# Patient Record
Sex: FEMALE | Race: BLACK OR AFRICAN AMERICAN | NOT HISPANIC OR LATINO | Employment: OTHER | URBAN - METROPOLITAN AREA
[De-identification: names, ages, dates, MRNs, and addresses within clinical notes are randomized per-mention and may not be internally consistent; named-entity substitution may affect disease eponyms.]

---

## 2018-02-08 ENCOUNTER — CONVERSION ENCOUNTER (OUTPATIENT)
Dept: GENERAL RADIOLOGY | Facility: HOSPITAL | Age: 63
End: 2018-02-08

## 2020-07-16 ENCOUNTER — HOSPITAL ENCOUNTER (OUTPATIENT)
Dept: GENERAL RADIOLOGY | Facility: HOSPITAL | Age: 65
Discharge: HOME | End: 2020-07-16
Attending: NURSE PRACTITIONER

## 2020-10-12 ENCOUNTER — OFFICE VISIT CONVERTED (OUTPATIENT)
Dept: FAMILY MEDICINE CLINIC | Facility: CLINIC | Age: 65
End: 2020-10-12
Attending: FAMILY MEDICINE

## 2020-10-12 ENCOUNTER — HOSPITAL ENCOUNTER (OUTPATIENT)
Dept: FAMILY MEDICINE CLINIC | Facility: CLINIC | Age: 65
Discharge: HOME | End: 2020-10-12
Attending: FAMILY MEDICINE

## 2020-10-12 ENCOUNTER — CONVERSION ENCOUNTER (OUTPATIENT)
Dept: FAMILY MEDICINE CLINIC | Facility: CLINIC | Age: 65
End: 2020-10-12

## 2020-10-12 LAB
ALBUMIN SERPL-MCNC: 4.4 G/DL (ref 3.5–5)
ALBUMIN SERPL-MCNC: 4.5 G/DL (ref 3.5–5)
ALBUMIN/GLOB SERPL: 1.2 {RATIO} (ref 1.4–2.6)
ALP SERPL-CCNC: 128 U/L (ref 43–160)
ALT SERPL-CCNC: 12 U/L (ref 10–40)
ANION GAP SERPL CALC-SCNC: 16 MMOL/L (ref 8–19)
ANION GAP SERPL CALC-SCNC: 17 MMOL/L (ref 8–19)
APPEARANCE UR: CLEAR
AST SERPL-CCNC: 17 U/L (ref 15–50)
BASOPHILS # BLD AUTO: 0.04 10*3/UL (ref 0–0.2)
BASOPHILS NFR BLD AUTO: 0.8 % (ref 0–3)
BILIRUB SERPL-MCNC: 0.21 MG/DL (ref 0.2–1.3)
BILIRUB UR QL: NEGATIVE
BUN SERPL-MCNC: 16 MG/DL (ref 5–25)
BUN SERPL-MCNC: 17 MG/DL (ref 5–25)
BUN/CREAT SERPL: 16 {RATIO} (ref 6–20)
BUN/CREAT SERPL: 17 {RATIO} (ref 6–20)
CALCIUM SERPL-MCNC: 9.7 MG/DL (ref 8.7–10.4)
CALCIUM SERPL-MCNC: 9.9 MG/DL (ref 8.7–10.4)
CHLORIDE SERPL-SCNC: 100 MMOL/L (ref 99–111)
CHLORIDE SERPL-SCNC: 99 MMOL/L (ref 99–111)
CHOLEST SERPL-MCNC: 172 MG/DL (ref 107–200)
CHOLEST/HDLC SERPL: 3 {RATIO} (ref 3–6)
COLOR UR: YELLOW
CONV ABS IMM GRAN: 0.02 10*3/UL (ref 0–0.2)
CONV BACTERIA: NEGATIVE
CONV CO2: 27 MMOL/L (ref 22–32)
CONV CO2: 27 MMOL/L (ref 22–32)
CONV COLLECTION SOURCE (UA): ABNORMAL
CONV CREATININE URINE, RANDOM: 52.8 MG/DL (ref 10–300)
CONV IMMATURE GRAN: 0.4 % (ref 0–1.8)
CONV PROTEIN TO CREATININE RATIO (RANDOM URINE): 0.08 {RATIO} (ref 0–0.1)
CONV TOTAL PROTEIN: 8 G/DL (ref 6.3–8.2)
CONV UROBILINOGEN IN URINE BY AUTOMATED TEST STRIP: 0.2 {EHRLICHU}/DL (ref 0.1–1)
CREAT UR-MCNC: 1 MG/DL (ref 0.5–0.9)
CREAT UR-MCNC: 1.03 MG/DL (ref 0.5–0.9)
DEPRECATED RDW RBC AUTO: 44.3 FL (ref 36.4–46.3)
EOSINOPHIL # BLD AUTO: 0.18 10*3/UL (ref 0–0.7)
EOSINOPHIL # BLD AUTO: 3.4 % (ref 0–7)
ERYTHROCYTE [DISTWIDTH] IN BLOOD BY AUTOMATED COUNT: 13.3 % (ref 11.7–14.4)
EST. AVERAGE GLUCOSE BLD GHB EST-MCNC: 123 MG/DL
GFR SERPLBLD BASED ON 1.73 SQ M-ARVRAT: >60 ML/MIN/{1.73_M2}
GFR SERPLBLD BASED ON 1.73 SQ M-ARVRAT: >60 ML/MIN/{1.73_M2}
GLOBULIN UR ELPH-MCNC: 3.6 G/DL (ref 2–3.5)
GLUCOSE SERPL-MCNC: 93 MG/DL (ref 65–99)
GLUCOSE SERPL-MCNC: 97 MG/DL (ref 65–99)
GLUCOSE UR QL: NEGATIVE MG/DL
HBA1C MFR BLD: 5.9 % (ref 3.5–5.7)
HCT VFR BLD AUTO: 43.5 % (ref 37–47)
HDLC SERPL-MCNC: 57 MG/DL (ref 40–60)
HGB BLD-MCNC: 13.2 G/DL (ref 12–16)
HGB UR QL STRIP: ABNORMAL
KETONES UR QL STRIP: NEGATIVE MG/DL
LDLC SERPL CALC-MCNC: 97 MG/DL (ref 70–100)
LEUKOCYTE ESTERASE UR QL STRIP: NEGATIVE
LYMPHOCYTES # BLD AUTO: 1.86 10*3/UL (ref 1–5)
LYMPHOCYTES NFR BLD AUTO: 35.1 % (ref 20–45)
MCH RBC QN AUTO: 27.4 PG (ref 27–31)
MCHC RBC AUTO-ENTMCNC: 30.3 G/DL (ref 33–37)
MCV RBC AUTO: 90.2 FL (ref 81–99)
MONOCYTES # BLD AUTO: 0.54 10*3/UL (ref 0.2–1.2)
MONOCYTES NFR BLD AUTO: 10.2 % (ref 3–10)
NEUTROPHILS # BLD AUTO: 2.66 10*3/UL (ref 2–8)
NEUTROPHILS NFR BLD AUTO: 50.1 % (ref 30–85)
NITRITE UR QL STRIP: NEGATIVE
NRBC CBCN: 0 % (ref 0–0.7)
OSMOLALITY SERPL CALC.SUM OF ELEC: 289 MOSM/KG (ref 273–304)
PH UR STRIP.AUTO: 5 [PH] (ref 5–8)
PHOSPHATE SERPL-MCNC: 3.3 MG/DL (ref 2.4–4.5)
PLATELET # BLD AUTO: 270 10*3/UL (ref 130–400)
PMV BLD AUTO: 11.4 FL (ref 9.4–12.3)
POTASSIUM SERPL-SCNC: 3.8 MMOL/L (ref 3.5–5.3)
POTASSIUM SERPL-SCNC: 3.9 MMOL/L (ref 3.5–5.3)
PROT UR QL: NEGATIVE MG/DL
PROT UR-MCNC: <4 MG/DL
RBC # BLD AUTO: 4.82 10*6/UL (ref 4.2–5.4)
RBC #/AREA URNS HPF: ABNORMAL /[HPF]
SODIUM SERPL-SCNC: 139 MMOL/L (ref 135–147)
SODIUM SERPL-SCNC: 139 MMOL/L (ref 135–147)
SP GR UR: 1.01 (ref 1–1.03)
TRIGL SERPL-MCNC: 92 MG/DL (ref 40–150)
VLDLC SERPL-MCNC: 18 MG/DL (ref 5–37)
WBC # BLD AUTO: 5.3 10*3/UL (ref 4.8–10.8)
WBC #/AREA URNS HPF: ABNORMAL /[HPF]

## 2020-10-13 LAB
C3 SERPL-MCNC: 173 MG/DL (ref 82–167)
C4 SERPL-MCNC: 45 MG/DL (ref 14–44)

## 2020-10-14 LAB
DSDNA AB SER-ACNC: NEGATIVE [IU]/ML
ENA AB SER IA-ACNC: POSITIVE {RATIO}

## 2020-10-15 LAB
CENTROMERE AB TITR SER IF: 38 [IU]/ML (ref 0–7)
ENA SCL70 AB SER QL IA: <0.6 [IU]/ML (ref 0–7)
JO-1 (WB)*: <0.3 [IU]/ML (ref 0–7)
RNP: 0.6 [IU]/ML (ref 0–5)
RNP: <0.3 [IU]/ML (ref 0–7)
SMI: 0.9 [IU]/ML (ref 0–7)
SSA (RO) (ENA) AB, IGG: <0.3 [IU]/ML (ref 0–7)
SSB (LA) ENA AB, IGG: <0.3 [IU]/ML (ref 0–7)

## 2020-10-21 ENCOUNTER — OFFICE VISIT CONVERTED (OUTPATIENT)
Dept: ORTHOPEDIC SURGERY | Facility: CLINIC | Age: 65
End: 2020-10-21
Attending: ORTHOPAEDIC SURGERY

## 2020-11-17 ENCOUNTER — HOSPITAL ENCOUNTER (OUTPATIENT)
Dept: PREADMISSION TESTING | Facility: HOSPITAL | Age: 65
Discharge: HOME | End: 2020-11-17
Attending: ORTHOPAEDIC SURGERY

## 2020-11-17 LAB
ALBUMIN SERPL-MCNC: 4.1 G/DL (ref 3.5–5)
ALBUMIN/GLOB SERPL: 1.3 {RATIO} (ref 1.4–2.6)
ALP SERPL-CCNC: 116 U/L (ref 43–160)
ALT SERPL-CCNC: 10 U/L (ref 10–40)
ANION GAP SERPL CALC-SCNC: 15 MMOL/L (ref 8–19)
APTT BLD: 22.5 S (ref 22.2–34.2)
AST SERPL-CCNC: 15 U/L (ref 15–50)
BASOPHILS # BLD AUTO: 0.04 10*3/UL (ref 0–0.2)
BASOPHILS NFR BLD AUTO: 0.8 % (ref 0–3)
BILIRUB SERPL-MCNC: 0.36 MG/DL (ref 0.2–1.3)
BUN SERPL-MCNC: 14 MG/DL (ref 5–25)
BUN/CREAT SERPL: 13 {RATIO} (ref 6–20)
CALCIUM SERPL-MCNC: 10.3 MG/DL (ref 8.7–10.4)
CHLORIDE SERPL-SCNC: 102 MMOL/L (ref 99–111)
CONV ABS IMM GRAN: 0.01 10*3/UL (ref 0–0.2)
CONV CO2: 28 MMOL/L (ref 22–32)
CONV IMMATURE GRAN: 0.2 % (ref 0–1.8)
CONV TOTAL PROTEIN: 7.3 G/DL (ref 6.3–8.2)
CREAT UR-MCNC: 1.04 MG/DL (ref 0.5–0.9)
DEPRECATED RDW RBC AUTO: 40.1 FL (ref 36.4–46.3)
EOSINOPHIL # BLD AUTO: 0.13 10*3/UL (ref 0–0.7)
EOSINOPHIL # BLD AUTO: 2.5 % (ref 0–7)
ERYTHROCYTE [DISTWIDTH] IN BLOOD BY AUTOMATED COUNT: 13 % (ref 11.7–14.4)
EST. AVERAGE GLUCOSE BLD GHB EST-MCNC: 140 MG/DL
GFR SERPLBLD BASED ON 1.73 SQ M-ARVRAT: >60 ML/MIN/{1.73_M2}
GLOBULIN UR ELPH-MCNC: 3.2 G/DL (ref 2–3.5)
GLUCOSE SERPL-MCNC: 100 MG/DL (ref 65–99)
HBA1C MFR BLD: 6.5 % (ref 3.5–5.7)
HCT VFR BLD AUTO: 39.7 % (ref 37–47)
HGB BLD-MCNC: 13.1 G/DL (ref 12–16)
INR PPP: 1.03 (ref 2–3)
LYMPHOCYTES # BLD AUTO: 1.59 10*3/UL (ref 1–5)
LYMPHOCYTES NFR BLD AUTO: 31.2 % (ref 20–45)
MCH RBC QN AUTO: 28.2 PG (ref 27–31)
MCHC RBC AUTO-ENTMCNC: 33 G/DL (ref 33–37)
MCV RBC AUTO: 85.4 FL (ref 81–99)
MONOCYTES # BLD AUTO: 0.49 10*3/UL (ref 0.2–1.2)
MONOCYTES NFR BLD AUTO: 9.6 % (ref 3–10)
NEUTROPHILS # BLD AUTO: 2.84 10*3/UL (ref 2–8)
NEUTROPHILS NFR BLD AUTO: 55.7 % (ref 30–85)
NRBC CBCN: 0 % (ref 0–0.7)
OSMOLALITY SERPL CALC.SUM OF ELEC: 293 MOSM/KG (ref 273–304)
PLATELET # BLD AUTO: 293 10*3/UL (ref 130–400)
PMV BLD AUTO: 10.2 FL (ref 9.4–12.3)
POTASSIUM SERPL-SCNC: 3.9 MMOL/L (ref 3.5–5.3)
PROTHROMBIN TIME: 11 S (ref 9.4–12)
RBC # BLD AUTO: 4.65 10*6/UL (ref 4.2–5.4)
SODIUM SERPL-SCNC: 141 MMOL/L (ref 135–147)
WBC # BLD AUTO: 5.1 10*3/UL (ref 4.8–10.8)

## 2020-11-19 ENCOUNTER — OFFICE VISIT CONVERTED (OUTPATIENT)
Dept: FAMILY MEDICINE CLINIC | Facility: CLINIC | Age: 65
End: 2020-11-19
Attending: FAMILY MEDICINE

## 2020-11-25 ENCOUNTER — HOSPITAL ENCOUNTER (OUTPATIENT)
Dept: PREADMISSION TESTING | Facility: HOSPITAL | Age: 65
Discharge: HOME | End: 2020-11-25
Attending: ORTHOPAEDIC SURGERY

## 2020-11-28 LAB — SARS-COV-2 RNA SPEC QL NAA+PROBE: NOT DETECTED

## 2020-12-01 ENCOUNTER — HOSPITAL ENCOUNTER (OUTPATIENT)
Dept: PERIOP | Facility: HOSPITAL | Age: 65
Setting detail: HOSPITAL OUTPATIENT SURGERY
Discharge: HOME | End: 2020-12-02
Attending: INTERNAL MEDICINE

## 2020-12-02 LAB
ANION GAP SERPL CALC-SCNC: 17 MMOL/L (ref 8–19)
BNP SERPL-MCNC: 346 PG/ML (ref 0–900)
BUN SERPL-MCNC: 19 MG/DL (ref 5–25)
BUN/CREAT SERPL: 15 {RATIO} (ref 6–20)
CALCIUM SERPL-MCNC: 9 MG/DL (ref 8.7–10.4)
CHLORIDE SERPL-SCNC: 101 MMOL/L (ref 99–111)
CONV CO2: 25 MMOL/L (ref 22–32)
CREAT UR-MCNC: 1.26 MG/DL (ref 0.5–0.9)
GFR SERPLBLD BASED ON 1.73 SQ M-ARVRAT: 52 ML/MIN/{1.73_M2}
GLUCOSE SERPL-MCNC: 155 MG/DL (ref 65–99)
HCT VFR BLD AUTO: 36.6 % (ref 37–47)
HGB BLD-MCNC: 11.7 G/DL (ref 12–16)
M PNEUMO IGM SER QL: NEGATIVE
OSMOLALITY SERPL CALC.SUM OF ELEC: 291 MOSM/KG (ref 273–304)
POTASSIUM SERPL-SCNC: 4.7 MMOL/L (ref 3.5–5.3)
SARS-COV-2 RNA SPEC QL NAA+PROBE: NOT DETECTED
SODIUM SERPL-SCNC: 138 MMOL/L (ref 135–147)

## 2020-12-04 LAB — BACTERIA SPEC AEROBE CULT: NORMAL

## 2020-12-14 ENCOUNTER — OFFICE VISIT CONVERTED (OUTPATIENT)
Dept: ORTHOPEDIC SURGERY | Facility: CLINIC | Age: 65
End: 2020-12-14
Attending: PHYSICIAN ASSISTANT

## 2021-01-15 ENCOUNTER — OFFICE VISIT CONVERTED (OUTPATIENT)
Dept: ORTHOPEDIC SURGERY | Facility: CLINIC | Age: 66
End: 2021-01-15
Attending: PHYSICIAN ASSISTANT

## 2021-02-22 ENCOUNTER — OFFICE VISIT CONVERTED (OUTPATIENT)
Dept: FAMILY MEDICINE CLINIC | Facility: CLINIC | Age: 66
End: 2021-02-22
Attending: FAMILY MEDICINE

## 2021-02-22 ENCOUNTER — HOSPITAL ENCOUNTER (OUTPATIENT)
Dept: FAMILY MEDICINE CLINIC | Facility: CLINIC | Age: 66
Discharge: HOME | End: 2021-02-22
Attending: FAMILY MEDICINE

## 2021-02-22 LAB
CHOLEST SERPL-MCNC: 183 MG/DL (ref 107–200)
CHOLEST/HDLC SERPL: 3.2 {RATIO} (ref 3–6)
EST. AVERAGE GLUCOSE BLD GHB EST-MCNC: 114 MG/DL
HBA1C MFR BLD: 5.6 % (ref 3.5–5.7)
HDLC SERPL-MCNC: 58 MG/DL (ref 40–60)
LDLC SERPL CALC-MCNC: 109 MG/DL (ref 70–100)
TRIGL SERPL-MCNC: 82 MG/DL (ref 40–150)
VLDLC SERPL-MCNC: 16 MG/DL (ref 5–37)

## 2021-02-24 ENCOUNTER — OFFICE VISIT CONVERTED (OUTPATIENT)
Dept: ORTHOPEDIC SURGERY | Facility: CLINIC | Age: 66
End: 2021-02-24
Attending: PHYSICIAN ASSISTANT

## 2021-02-24 ENCOUNTER — HOSPITAL ENCOUNTER (OUTPATIENT)
Dept: GENERAL RADIOLOGY | Facility: HOSPITAL | Age: 66
Discharge: HOME | End: 2021-02-24
Attending: FAMILY MEDICINE

## 2021-03-19 ENCOUNTER — HOSPITAL ENCOUNTER (OUTPATIENT)
Dept: VACCINE CLINIC | Facility: HOSPITAL | Age: 66
Discharge: HOME | End: 2021-03-19
Attending: INTERNAL MEDICINE

## 2021-04-09 ENCOUNTER — OFFICE VISIT CONVERTED (OUTPATIENT)
Dept: ORTHOPEDIC SURGERY | Facility: CLINIC | Age: 66
End: 2021-04-09

## 2021-04-16 ENCOUNTER — HOSPITAL ENCOUNTER (OUTPATIENT)
Dept: VACCINE CLINIC | Facility: HOSPITAL | Age: 66
Discharge: HOME | End: 2021-04-16
Attending: INTERNAL MEDICINE

## 2021-05-10 NOTE — H&P
History and Physical      Patient Name: Wilner Jacobson   Patient ID: [id number]   Sex: Female   YOB: 1955    Referring Provider: Abrahan Wilson MD    Visit Date: October 12, 2020    Provider: Abrahan Rick DO   [place] [facility] [place] Address: [address] [place] [address], [address] [place] [address]  [id number]   Location Phone: [phone]          Chief Complaint  · establish care      History Of Present Illness  Wilner Jacobson is a 65 year old /Black female who presents for evaluation and treatment of:      Patient presents today to establish care with myself.  She is previously seen at [facility] in [place].  She reports her insurance changed.  She reports having had labs done a few months ago but I do not have record of this.  Reviewing her hospital record shows that back in June 2018 she had no anemia with normal WBC count and platelets.  Creatinine is slightly elevated at 1.13.  She does report that she sees Dr. Mukherjee from nephrology.  She does have blood in her urine.  She also has issues with arthritis.  She reports that she has bilateral knee pain.  She previously was seeing Dr. Torres.  Corticosteroid injections have only lasted for about a week.  She is requesting to get back in to be seen.  She was told previously that she would need her left knee replaced.  She has issues with both knees.  She does take diclofenac and ibuprofen although she was discouraged on using ibuprofen given her renal function.  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  She reports completing her treatment therapy but she is unsure what she took.  She does have a card from the health department but it does not state what medication she was taking.  She did have a mammogram recently back in July which was BI-RADS 1.  Patient is due for labs so we discussed getting these done today and seeing her back in about 1 month  for follow-up.  She also has lab orders from her nephrologist.  She is not seeing any other doctors at this time.  She does have a son who is 36 and a daughter who is 37.  Her son has had lymphoma.  Her children live close by.  She is originally from the [place] area but she moved here about 10 years ago.       Past Medical History  Arthritis; Bladder Disorder; Hematuria; High cholesterol; Hyperlipemia; Hypertension; Renal Cyst         Past Surgical History  Cystoscopy; I have had no surgeries         Medication List  cyclobenzaprine 5 mg oral tablet; diclofenac sodium 1 % topical gel; hydrochlorothiazide 25 mg oral tablet; ibuprofen 800 mg oral tablet; lisinopril 40 mg oral tablet; meclizine 25 mg oral tablet; simvastatin 20 mg oral tablet         Allergy List  NO KNOWN DRUG ALLERGIES       Allergies Reconciled  Family Medical History  *Non Contributory; Family history of certain chronic disabling diseases; arthritis         Social History  Alcohol Use (Current some day); lives with spouse; .; Recreational Drug Use (Never); Tobacco (Never); Working         Review of Systems     General: Denies any fever, chills, weight changes, or night sweats  HEENT:  Denies any vision or hearing changes. Denies any neck tenderness. Denies any headaches. Denies nasal congestion  Cardiovascular: Denies any chest pain or palpitations  respiratory: Denies any cough or wheezing. Denies any shortness of breath  Gastrointestinal: Denies any nausea vomiting or diarrhea, Denies constipation  Extremities: Denies any edema  Psychiatric: Denies any changes in mood or affect  Neurologic: Denies any neurologic deficits  skin: Denies any rashes or lesions.  endocrine: Denies any weight loss, fever, night sweats  Musculoskeletal: Bilateral knee pain       Vitals  Date Time BP Position Site L\R Cuff Size HR RR TEMP (F) WT  HT  BMI kg/m2 BSA m2 O2 Sat FR L/min FiO2 HC       10/12/2020 09:22 /70 Sitting    80 - R  98.3 246lbs 4oz 5'  " 4\" 42.27 2.25 98 %            Physical Examination     General: AAO 3, no acute distress, pleasant  HEENT: Normocephalic, atraumatic, no discharge in the eyes, no discharge from the nose, no oropharyngeal erythema or exudates, and TMs intact bilaterally with no erythema, no cervical tenderness or lymphadenopathy  Cardiovascular: Regular rate and rhythm without appreciable murmur  Respiratory: Clear to auscultation bilaterally no RRW  Gastrointestinal: Soft nontender nondistended with bowel sounds present  extremities: No clubbing, cyanosis or edema  Neurologic: CN II through XII grossly intact   Psychiatric: Normal mood and affect           Assessment  · Screening for depression     V79.0/Z13.89  · Need for influenza vaccination     V04.81/Z23  · Need for pneumococcal vaccination     V03.82/Z23  · Hypertension     401.9/I10  · Medication monitoring encounter     V58.83/Z51.81  · Abnormal glucose     790.29/R73.09  · HLD (hyperlipidemia)     272.4/E78.5  · Left knee pain     719.46/M25.562  · Bilateral knee pain       Pain in right knee     719.46/M25.561  Pain in left knee     719.46/M25.562  · Constipation     564.00/K59.00      Plan  · Orders  o ACO-14: Influenza immunization was not administered for reasons documented () - V04.81/Z23 - 10/12/2020   DECLINES  o CMP [facility] (09849) - 401.9/I10 - 10/12/2020  o Hgb A1c [facility] ([id number]) - 790.29/R73.09 - 10/12/2020  o Lipid Panel [facility] (89675) - 272.4/E78.5 - 10/12/2020  o ACO-39: Current medications updated and reviewed (1159F, ) - - 10/12/2020  o ACO-18: Negative screen for clinical depression using a standardized tool () - V79.0/Z13.89 - 10/12/2020  o ACO-14: Influenza immunization was not administered for reasons documented [facility] () - - 10/12/2020   declines  o ORTHOPEDIC CONSULTATION (ORTHO) - 719.46/M25.561, 719.46/M25.562 - 10/12/2020   Please refer back to Dr. Torres locally  · Medications  o hydrochlorothiazide 25 mg oral tablet   SIG: take 1 tablet (25 " mg) by oral route once daily for blood pressure   DISP: (90) Tablet with 3 refills  Refilled on 10/12/2020     o lisinopril 40 mg oral tablet   SIG: take 1 tablet (40 mg) by oral route once daily for blood pressure   DISP: (90) Tablet with 3 refills  Refilled on 10/12/2020     o simvastatin 20 mg oral tablet   SIG: take 1 tablet (20 mg) by oral route once daily in the evening   DISP: (90) Tablet with 3 refills  Refilled on 10/12/2020     · Instructions  o Depression Screen completed and scanned into the EMR under the designated folder within the patient's documents.  o Today's PHQ-9 result is __0_  o Patient was educated/instructed on their diagnosis, treatment and medications prior to discharge from the clinic today.  o Patient instructed to seek medical attention urgently for new or worsening symptoms.  o Call the office with any concerns or questions.  o Plan as documented above. Discussed continue current medical management. She does have hypertension and is taking 25 mg of hydrochlorothiazide daily. She is also taking lisinopril and simvastatin. I would like to see her back in 1 month to discuss her labs and any other issues that may come up. I encouraged her to call if there is any issues or come up in the meantime. Patient verbalized understanding and is in agreement with treatment and management plan.  o I will refer her to orthopedics for her bilateral knee pain.  · Disposition  o Follow Up in 1 month.            Electronically Signed by: Abrahan Rick DO -Author on October 12, 2020 12:55:36 PM

## 2021-05-10 NOTE — H&P
History and Physical      Patient Name: Wilner Jacobson   Patient ID: [id number]   Sex: Female   YOB: 1955    Referring Provider: Abrahan Wilson MD    Visit Date: October 21, 2020    Provider: Dada Torres MD   Location: [facility]   Location Address: [address] [place] [address]  [id number]   Location Phone: [phone]          Chief Complaint  · Bilateral Knee Pain      History Of Present Illness  Wilner Jacobson is a 65 year old /Black female who presents today to [facility].      Patient presents today with a chief complaint of bilateral knee pain. Patient states her left knee is worse than her right. Patient states she got a new PCP and wanted her to follow-up with Dr. Torres. 4 years ago patient had some interest in a left total knee but tried holding it off on it until now. Patient ambulates with a cane due to knee pain.       Past Medical History  Arthritis; Bladder Disorder; Hematuria; High cholesterol; Hyperlipemia; Hypertension; Renal Cyst         Past Surgical History  Cystoscopy; I have had no surgeries         Medication List  cyclobenzaprine 5 mg oral tablet; diclofenac sodium 1 % topical gel; hydrochlorothiazide 25 mg oral tablet; ibuprofen 800 mg oral tablet; lisinopril 40 mg oral tablet; meclizine 25 mg oral tablet; simvastatin 20 mg oral tablet         Allergy List  NO KNOWN DRUG ALLERGIES         Family Medical History  Diabetes, unspecified type; *Non Contributory; Family history of certain chronic disabling diseases; arthritis; Family history of Arthritis         Social History  Alcohol Use (Current some day); lives with spouse; .; Recreational Drug Use (Never); Retired.; Tobacco (Never); Working         Immunizations  Name Date Admin   Influenza Refused         Review of Systems  · Constitutional  o Denies  o : fever, chills, weight loss  · Cardiovascular  o Denies  o : chest pain, shortness of  "breath  · Gastrointestinal  o Denies  o : liver disease, heartburn, nausea, blood in stools  · Genitourinary  o Denies  o : painful urination, blood in urine  · Integument  o Denies  o : rash, itching  · Neurologic  o Denies  o : headache, weakness, loss of consciousness  · Musculoskeletal  o Denies  o : painful, swollen joints  · Psychiatric  o Denies  o : drug/alcohol addiction, anxiety, depression      Vitals  Date Time BP Position Site L\R Cuff Size HR RR TEMP (F) WT  HT  BMI kg/m2 BSA m2 O2 Sat FR L/min FiO2 HC       10/21/2020 02:42 PM      80 - R   248lbs 8oz 5'  4\" 42.65 2.26 96 %            Physical Examination  · Constitutional  o Appearance  o : well developed, well-nourished, no obvious deformities present  · Head and Face  o Head  o :   § Inspection  § : normocephalic  o Face  o :   § Inspection  § : no facial lesions  · Eyes  o Conjunctivae  o : conjunctivae normal  o Sclerae  o : sclerae white  · Ears, Nose, Mouth and Throat  o Ears  o :   § External Ears  § : appearance within normal limits  § Hearing  § : intact  o Nose  o :   § External Nose  § : appearance normal  · Neck  o Inspection/Palpation  o : normal appearance  o Range of Motion  o : full range of motion  · Respiratory  o Respiratory Effort  o : breathing unlabored  o Inspection of Chest  o : normal appearance  o Auscultation of Lungs  o : no audible wheezing or rales  · Cardiovascular  o Heart  o : regular rate  · Gastrointestinal  o Abdominal Examination  o : soft and non-tender  · Skin and Subcutaneous Tissue  o General Inspection  o : intact, no rashes  · Psychiatric  o General  o : Alert and oriented x3  o Judgement and Insight  o : judgment and insight intact  o Mood and Affect  o : mood normal, affect appropriate  · Right Knee  o Inspection  o : Sensation grossly intact. Neurovascular intact. Pulses normal. Skin intact. Ambulation with a cane. Full weight-bearing. Crepitus present. Good strength in quadriceps, hamstrings, " dorsiflexors, and plantar flexors. Mildly tender medial joint line. Mildly tender lateral joint line. Negative Lachman. Negative Apley's. Negative Ceci's. Negative O'Briens.   · Left Knee  o Inspection  o : Sensation grossly intact. Neurovascular intact. Pulses normal. Skin intact. Ambulation with a cane. Full weight-bearing. Crepitus present. Good strength in quadriceps, hamstrings, dorsiflexors, and plantar flexors. Tender medial joint line. Tender lateral joint line. Negative Lachman. Negative Apley's. Negative Ceci's. Negative O'Briens.   · In Office Procedures  o View  o : LAT/SUNRISE/STANDING   o Site  o : bilateral, knee  o Indication  o : Bilateral knee pain  o Study  o : X-rays ordered, taken in the office, and reviewed today.  o Xray  o : Advanced degenerative changes consistent with osteoarthritis. Degenerative changes present with the right knee. Negative signs of fracture or dislocation in bilateral knees. Kellgren and Denis grade 4 showing large osteophytes and marked narrowing of joint space.           Assessment  · Primary osteoarthritis of right knee     715.16/M17.11  · Primary osteoarthritis of left knee     715.16/M17.12  · Pain in both knees, unspecified chronicity       Pain in right knee     719.46/M25.561  Pain in left knee     719.46/M25.562      Plan  · Orders  o Knee (Left) [facility] ([id number]) - 719.46/M25.562 - 10/21/2020  o Knee (Right) [facility] ([id number]) - 719.46/M25.561 - 10/21/2020  · Medications  o Medications have been Reconciled  o Transition of Care or Provider Policy  · Instructions  o Dr. Torres saw and examined the patient and agrees with plan.   o X-rays reviewed by Dr. Torres.  o Reviewed the patient's Past Medical, Social, and Family history as well as the ROS at today's visit, no changes.  o Call or return if worsening symptoms.  o Discussed surgery.  o Risks/benefits discussed with patient including, but not limited to: infection, bleeding,  neurovascular damage, malunion, nonunion, aesthetic deformity, need for further surgery, and death.  o Discussed with patient the implant type being used during surgery and patient understands and desires to proceed.  o Surgery pamphlet given.  o Follow Up post-op.  o This note was transcribed by Kimmy Cote. [email or web address]  o Discussed diagnosis and treatment options with the patient. Patient opted for a left total knee replacement. The patient was given pre-operative muscle strengthening and flexibility exercises to work on at home. It was also discussed with the patient a weight reduction plan to help with joint pain pre and post-op procedure. Patient will work on loosing 25 > lbs prior to scheduled procedure.            Electronically Signed by: Kimmy Cote-, Other -Author on November 16, 2020 09:39:31 AM  Electronically Co-signed by: Dada Torres MD -Reviewer on November 16, 2020 07:47:50 PM

## 2021-05-13 NOTE — PROGRESS NOTES
Progress Note      Patient Name: Wilner Jacobson   Patient ID: [id number]   Sex: Female   YOB: 1955    Referring Provider: Abrahan Wilson MD    Visit Date: November 19, 2020    Provider: Abrahan Rick DO   [place] [facility] [place]   Location Address: [address] [place] [address], [address] [place] [address]  [id number]   Location Phone: [phone]          Chief Complaint  · follow up      History Of Present Illness  Wilner Jacobson is a 65 year old /Black female who presents for evaluation and treatment of:      Patient presents for 1 month follow-up.  Since last time I saw her she has been in to see orthopedics, Dr. Palafox.  They are considering a left knee replacement.  She had her preoperative work done.  Unfortunately A1c has now increased to 6.5%.  This would give her a new diagnosis of diabetes.  She has been prediabetic for quite some time.  We discussed lifestyle modification including diet and exercise.  We will repeat her A1c in 3 months and reassess from there.       Past Medical History  Arthritis; Bladder Disorder; Hematuria; High cholesterol; Hyperlipemia; Hypertension; Renal Cyst         Past Surgical History  Cystoscopy; I have had no surgeries         Medication List  cyclobenzaprine 5 mg oral tablet; diclofenac sodium 1 % topical gel; hydrochlorothiazide 25 mg oral tablet; ibuprofen 800 mg oral tablet; lisinopril 40 mg oral tablet; meclizine 25 mg oral tablet; simvastatin 20 mg oral tablet         Allergy List  NO KNOWN DRUG ALLERGIES         Family Medical History  Diabetes, unspecified type; *Non Contributory; Family history of certain chronic disabling diseases; arthritis; Family history of Arthritis         Social History  Alcohol Use (Current some day); lives with spouse; .; Recreational Drug Use (Never); Retired.; Tobacco (Never); Working         Immunizations  Name Date Admin   Influenza Refused         Review of Systems     Gen: Denies any fever,  "chills, or weight changes  HEENT: Denies any changes in vision or hearing, denies nasal congestion, denies any neck tenderness or lymphadenopathy  Extremities: Occasional edema around ankles bilaterally  Psychiatric: Denies any changes in mood or affect  Neurologic: Denies any deficits  Skin: Denies any rashes  Musculoskeletal: Reports muscle cramps/spasms at times in her legs.       Vitals  Date Time BP Position Site L\R Cuff Size HR RR TEMP (F) WT  HT  BMI kg/m2 BSA m2 O2 Sat FR L/min FiO2        11/19/2020 11:23 /66 Sitting    97 - R  97.3 255lbs 0oz 5'  4\" 43.77 2.29 99 %            Physical Examination     General: AAO 3, no acute distress, pleasant  HEENT: Normocephalic, atraumatic  Cardiovascular: Regular rate and rhythm without appreciable murmur  Respiratory: Clear to auscultation bilaterally no RRW  Gastrointestinal: Soft nontender nondistended with bowel sounds present  extremities: Dependent edema noted in the lower extremities bilaterally.    Neurologic: CN II through XII grossly intact   Psychiatric: Normal mood and affect           Assessment  · Diabetes mellitus, type 2     250.00/E11.9  · Need for influenza vaccination     V04.81/Z23  · Medication monitoring encounter     V58.83/Z51.81  · Hypertension     401.9/I10  · HLD (hyperlipidemia)     272.4/E78.5  · Bilateral knee pain       Pain in right knee     719.46/M25.561  Pain in left knee     719.46/M25.562  · Muscle spasm     728.85/M62.838  Plan as documented above. Discussed lifestyle modification including diet and exercise. She is planning for a knee replacement in early December. I will see her back in 3 months with labs to be done prior to next appointment. Discussed for her muscle cramps/spasms using a vitamin B complex and/or vitamin E.      Plan  · Orders  o ACO-14: Influenza immunization was not administered for reasons documented () - V04.81/Z23 - 11/19/2020   declines  o Hgb A1c [facility] ([id number]) - 250.00/E11.9, V58.83/Z51.81 - " 02/19/2021  o Lipid Panel [facility] ([id number]) - 272.4/E78.5 - 02/19/2021  o ACO-39: Current medications updated and reviewed (1159F, ) - - 11/19/2020  o ACO-14: Influenza immunization was not administered for reasons documented [facility] () - - 11/19/2020   Declines  o ACO-13: Fall Risk Screening with no falls in past year or only one fall without injury in the past year (1101F) - - 11/19/2020  o ACO - Pt declines to or was not able to provide an Advance Care Plan or name a Surrogate Decision Maker (1124F) - - 11/19/2020  · Instructions  o Patient was educated/instructed on their diagnosis, treatment and medications prior to discharge from the clinic today.  o Patient instructed to seek medical attention urgently for new or worsening symptoms.  o Call the office with any concerns or questions.  · Disposition  o Follow Up in 3 months.            Electronically Signed by: Abrahan Rick DO -Author on November 19, 2020 12:08:01 PM

## 2021-05-13 NOTE — PROGRESS NOTES
Progress Note      Patient Name: Wilner Jacobson   Patient ID: [id number]   Sex: Female   YOB: 1955    Referring Provider: Abrahan Wilson MD    Visit Date: December 14, 2020    Provider: Iva Alan PA-C   [facility]: [facility]   Location Address: [address] [place] [address]  [id number]   Location Phone: [phone]          Chief Complaint  · left knee pain      History Of Present Illness  Wilner Jacobson is a 65 year old /Black female who presents today to [facility].      She is s/p left TKA 12/1/20 by Dr. Torres. She is doing well. She is making progress in PT. She denies fever/chills/numbness/tingling.       Past Medical History  Arthritis; Bladder Disorder; Diabetes; Hematuria; High cholesterol; Hyperlipemia; Hypertension; Renal Cyst         Past Surgical History  Cystoscopy; I have had no surgeries         Medication List  cyclobenzaprine 5 mg oral tablet; diclofenac sodium 1 % topical gel; hydrochlorothiazide 25 mg oral tablet; ibuprofen 800 mg oral tablet; lisinopril 40 mg oral tablet; meclizine 25 mg oral tablet; Norco 7.5-325 mg oral tablet; simvastatin 20 mg oral tablet         Allergy List  NO KNOWN DRUG ALLERGIES       Allergies Reconciled  Family Medical History  Diabetes, unspecified type; *Non Contributory; Family history of certain chronic disabling diseases; arthritis; Family history of Arthritis         Social History  Alcohol Use (Current some day); lives with spouse; .; Recreational Drug Use (Never); Retired.; Tobacco (Never); Working         Review of Systems  · Constitutional  o Denies  o : fever, chills, weight loss  · Cardiovascular  o Denies  o : chest pain, shortness of breath  · Gastrointestinal  o Denies  o : liver disease, heartburn, nausea, blood in stools  · Genitourinary  o Denies  o : painful urination, blood in urine  · Integument  o Denies  o : rash, itching  · Neurologic  o Denies  o : headache, weakness, loss of  "consciousness  · Musculoskeletal  o Admits  o : painful, swollen joints  · Psychiatric  o Denies  o : drug/alcohol addiction, anxiety, depression      Vitals  Date Time BP Position Site L\R Cuff Size HR RR TEMP (F) WT  HT  BMI kg/m2 BSA m2 O2 Sat FR L/min FiO2 HC       12/14/2020 02:19 PM         255lbs 0oz 5'  4\" 43.77 2.29 94 %            Physical Examination  · Constitutional  o Appearance  o : well developed, well-nourished, no obvious deformities present  · Head and Face  o Head  o :   § Inspection  § : normocephalic  o Face  o :   § Inspection  § : no facial lesions  · Eyes  o Conjunctivae  o : conjunctivae normal  o Sclerae  o : sclerae white  · Ears, Nose, Mouth and Throat  o Ears  o :   § External Ears  § : appearance within normal limits  § Hearing  § : intact  o Nose  o :   § External Nose  § : appearance normal  · Neck  o Inspection/Palpation  o : normal appearance  o Range of Motion  o : full range of motion  · Respiratory  o Respiratory Effort  o : breathing unlabored  o Inspection of Chest  o : normal appearance  o Auscultation of Lungs  o : no audible wheezing or rales  · Cardiovascular  o Heart  o : regular rate  · Gastrointestinal  o Abdominal Examination  o : soft and non-tender  · Skin and Subcutaneous Tissue  o General Inspection  o : intact, no rashes  · Psychiatric  o General  o : Alert and oriented x3  o Judgement and Insight  o : judgment and insight intact  o Mood and Affect  o : mood normal, affect appropriate  · Left Knee  o Inspection  o : Well approximated incision. No signs of infection. Extension -5 degrees. Flexion 90 degrees. Patella well tracking. Calf supple/nontender. Negative Adenike's. Neurovascularly intact. Ambulates with walker.   · In Office Procedures  o View  o : AP/LATERAL/SUNRISE   o Site  o : left, knee  o Indication  o : left knee pain  o Study  o : X-rays ordered, taken in the office, and reviewed today.  o Xray  o : Well aligned left TKA  o Comparative Data  o : " Comparative Data found and reviewed today           Assessment  · Aftercare following left knee joint replacement surgery       Aftercare following joint replacement surgery     V54.81/Z47.1  Presence of left artificial knee joint     V54.81/Z96.652  · Left knee pain, unspecified chronicity     719.46/M25.562      Plan  · Orders  o Knee (Left) [facility] Preferred View ([id number]) - 719.46/M25.562 - 12/14/2020  · Medications  o Medications have been Reconciled  o Transition of Care or Provider Policy  · Instructions  o Reviewed the patient's Past Medical, Social, and Family history as well as the ROS at today's visit, no changes.  o Call or return if worsening symptoms.  o Continue PT. Follow Up in 4 weeks.   o Continue PT. Follow up 4 weeks.   o Electronically Identified Patient Education Materials Provided Electronically            Electronically Signed by: Iva Alan PA-C -Author on December 14, 2020 04:34:17 PM  Electronically Co-signed by: Mikala Meyer, -Reviewer on April 30, 2021 01:03:22 PM

## 2021-05-14 VITALS
TEMPERATURE: 97.3 F | WEIGHT: 255 LBS | SYSTOLIC BLOOD PRESSURE: 108 MMHG | OXYGEN SATURATION: 99 % | BODY MASS INDEX: 43.54 KG/M2 | HEIGHT: 64 IN | DIASTOLIC BLOOD PRESSURE: 66 MMHG | HEART RATE: 97 BPM

## 2021-05-14 VITALS
HEIGHT: 64 IN | OXYGEN SATURATION: 98 % | HEART RATE: 80 BPM | SYSTOLIC BLOOD PRESSURE: 110 MMHG | TEMPERATURE: 98.3 F | BODY MASS INDEX: 42.04 KG/M2 | WEIGHT: 246.25 LBS | DIASTOLIC BLOOD PRESSURE: 70 MMHG

## 2021-05-14 VITALS — BODY MASS INDEX: 43.54 KG/M2 | WEIGHT: 255 LBS | HEIGHT: 64 IN | OXYGEN SATURATION: 94 %

## 2021-05-14 VITALS
TEMPERATURE: 98.1 F | HEART RATE: 96 BPM | HEIGHT: 64 IN | OXYGEN SATURATION: 99 % | DIASTOLIC BLOOD PRESSURE: 68 MMHG | BODY MASS INDEX: 40.98 KG/M2 | SYSTOLIC BLOOD PRESSURE: 104 MMHG | WEIGHT: 240.06 LBS

## 2021-05-14 VITALS — HEART RATE: 97 BPM | HEIGHT: 64 IN | BODY MASS INDEX: 41.66 KG/M2 | OXYGEN SATURATION: 99 % | WEIGHT: 244 LBS

## 2021-05-14 VITALS — HEIGHT: 64 IN | OXYGEN SATURATION: 97 % | BODY MASS INDEX: 40.8 KG/M2 | HEART RATE: 93 BPM | WEIGHT: 239 LBS

## 2021-05-14 VITALS — BODY MASS INDEX: 43.36 KG/M2 | OXYGEN SATURATION: 99 % | WEIGHT: 254 LBS | HEIGHT: 64 IN | HEART RATE: 76 BPM

## 2021-05-14 VITALS — WEIGHT: 248.5 LBS | HEART RATE: 80 BPM | OXYGEN SATURATION: 96 % | HEIGHT: 64 IN | BODY MASS INDEX: 42.43 KG/M2

## 2021-05-14 NOTE — PROGRESS NOTES
Progress Note      Patient Name: Wilner Jacobson   Patient ID: [id number]   Sex: Female   YOB: 1955    Primary Care Provider: Abrahan Rick DO   Referring Provider: Abrahan Wilson MD    Visit Date: February 22, 2021    Provider: Abrahan Rick DO   [place] [facility] [place]   Location Address: [address] [place] [address], [address] [place] [address]  [id number]   Location Phone: [phone]          Chief Complaint  · check up      History Of Present Illness  Wilner Jacobson is a 65 year old /Black female who presents for evaluation and treatment of:      Presents today for checkup.  She has had her left knee replaced.  She is overall doing with this.  She is still taking ibuprofen.  She had her knee replaced by Dr. Torres.  She gets low back pain for the past few months.  Denies any recent injury.  Upon talking about her back pain I did review her records under imaging and noticed an MRI of the abdomen in which was done in 2013.  She had a 1 cm indeterminate mass in the lower pole right kidney which was not consistent with a simple cyst with no enhancement but it was stable to the prior imaging from 2/26/2013.  When reviewing the record she did see Dr. Bledsoe in 2013 and at that time was recommended to repeat the MRI in 1 year but it does not appear that this was done outside of a CT of the abdomen pelvis that was done in 2017 which showed that this was a benign cyst.  There is no evidence of a stone or mass or obstruction in the right kidney.  We discussed holding off on further evaluation at this time given stability and time that has gone by since the 2013 study as 4 years later it is still the same size.  She is due for labs.  Her last A1c was 6.5%.  We discussed lifestyle modification including diet and exercise.  She is due for mammogram so we will get this done.  For low back I will get an x-ray as well as prescribe her tizanidine to use on as-needed basis for back  "pain/muscle spasms.  Patient instructed to avoid operating any heavy machinery or equipment while taking tizanidine due to sedating effect.       Past Medical History  Arthritis; Bladder Disorder; Diabetes; Hematuria; High cholesterol; Hyperlipemia; Hypertension; Renal Cyst         Past Surgical History  Cystoscopy; I have had no surgeries         Medication List  cyclobenzaprine 5 mg oral tablet; diclofenac sodium 1 % topical gel; hydrochlorothiazide 25 mg oral tablet; ibuprofen 600 mg oral tablet; ibuprofen 800 mg oral tablet; lisinopril 40 mg oral tablet; meclizine 25 mg oral tablet; Norco 7.5-325 mg oral tablet; simvastatin 20 mg oral tablet         Allergy List  NO KNOWN DRUG ALLERGIES       Allergies Reconciled  Family Medical History  Diabetes, unspecified type; *Non Contributory; Family history of certain chronic disabling diseases; arthritis; Family history of Arthritis         Social History  Alcohol Use (Current some day); lives with spouse; .; Recreational Drug Use (Never); Retired.; Tobacco (Never); Working         Immunizations  Name Date Admin   Influenza Refused         Review of Systems     Gen: Denies any fever, chills, or weight changes  HEENT: Denies any changes in vision or hearing, denies nasal congestion, denies any neck tenderness or lymphadenopathy  Extremities: Denies edema  Psychiatric: Denies any changes in mood or affect  Neurologic: Denies any deficits  Skin: Denies any rashes  Musculoskeletal: Low back pain       Vitals  Date Time BP Position Site L\R Cuff Size HR RR TEMP (F) WT  HT  BMI kg/m2 BSA m2 O2 Sat FR L/min FiO2 HC       02/22/2021 10:30 /68 Sitting    96 - R  98.1 240lbs 1oz 5'  4\" 41.21 2.22 99 %            Physical Examination     General: AAO 3, no acute distress, pleasant  HEENT: Normocephalic, atraumatic  Cardiovascular: Regular rate and rhythm without appreciable murmur  Respiratory: Clear to auscultation bilaterally no RRW  Gastrointestinal: Soft nontender " nondistended with bowel sounds present  Musculoskeletal: Paraspinal hypertonicity of the lumbar spine.  Nontender to palpation.  extremities: No edema  Neurologic: CN II through XII grossly intact   Psychiatric: Normal mood and affect           Assessment  · Diabetes mellitus, type 2     250.00/E11.9  · Visit for screening mammogram     V76.12/Z12.31  · Hypertension     401.9/I10  · Left knee pain     719.46/M25.562  · Status post knee replacement, left     V43.65/Z96.659  · Low back pain     724.2/M54.5  · Renal cyst, right kidney     753.10/N28.1      Plan  · Orders  o Screening Mammography; Bilateral 3D (36039, , 72947) - V76.12/Z12.31 - 02/22/2021  o ACO-14: Influenza immunization was not administered for reasons documented [facility] () - - 02/22/2021   declines  o ACO-13: Fall Risk Screening with no falls in past year or only one fall without injury in the past year (1101F) - - 02/22/2021  o ACO - Pt declines to or was not able to provide an Advance Care Plan or name a Surrogate Decision Maker (1124F) - - 02/22/2021  o ACO-39: Current medications updated and reviewed (, 1159F) - - 02/22/2021  o Lumbar Spine Complete [facility] Preferred View (23476) - 724.2/M54.5 - 02/22/2021   Pain x 1 year. No known injury  · Medications  o tizanidine 4 mg oral tablet   SIG: take 1 tablet (4 mg) by oral route every 8 hours as needed for back pain/muscle spasms   DISP: (90) Tablet with 0 refills  Prescribed on 02/22/2021     · Instructions  o Patient was educated/instructed on their diagnosis, treatment and medications prior to discharge from the clinic today.  o Patient instructed to seek medical attention urgently for new or worsening symptoms.  o Call the office with any concerns or questions.  o Plan as documented above. Plan on seeing patient back in 3 months or sooner if needed. Patient structured to call with any questions or concerns. She declines flu and pneumococcal vaccine. Mammogram has been  ordered.  · Disposition  o Follow Up in 3 months.            Electronically Signed by: Abrahan Rick DO -Author on February 22, 2021 11:25:48 AM

## 2021-05-14 NOTE — PROGRESS NOTES
Progress Note      Patient Name: Wilner Jacobson   Patient ID: [id number]   Sex: Female   YOB: 1955    Primary Care Provider: Abrahan Rick DO   Referring Provider: Abrahan Wilson MD    Visit Date: April 9, 2021    Provider: Jeremy Oneill PA-C   [facility]   Location Address: [address] [place] [address]  [id number]   Location Phone: [phone]          Chief Complaint  · Left knee pain       History Of Present Illness  Wilner Jacobson is a 65 year old /Black female who presents today to [facility].      The patient presents here today for follow up evaluation of the left knee. The patient is S/P left Total Knee Arthroplasty 12/1/2021 by Dr. Torres. The patient continues to demonstrate decreased ROM. He has multiple questions about paresthesia to the surgical site and swelling.       Past Medical History  Arthritis; Bladder disorder; Diabetes; Hematuria; High cholesterol; Hyperlipemia; Hypertension; Renal Cyst         Past Surgical History  Cystoscopy; I have had no surgeries         Medication List  atorvastatin 40 mg oral tablet; cyclobenzaprine 5 mg oral tablet; diclofenac sodium 1 % topical gel; hydrochlorothiazide 25 mg oral tablet; ibuprofen 600 mg oral tablet; ibuprofen 800 mg oral tablet; lisinopril 40 mg oral tablet; meclizine 25 mg oral tablet; Norco 7.5-325 mg oral tablet; tizanidine 4 mg oral tablet         Allergy List  NO KNOWN DRUG ALLERGIES       Allergies Reconciled  Family Medical History  Diabetes, unspecified type; *Non Contributory; Family history of certain chronic disabling diseases; arthritis; Family history of Arthritis         Social History  Alcohol Use (Current some day); lives with spouse; .; Recreational Drug Use (Never); Retired.; Tobacco (Never); Working         Review of Systems  · Constitutional  o Denies  o : fever, chills, weight loss  · Cardiovascular  o Denies  o : chest pain, shortness of  "breath  · Gastrointestinal  o Denies  o : liver disease, heartburn, nausea, blood in stools  · Genitourinary  o Denies  o : painful urination, blood in urine  · Integument  o Denies  o : rash, itching  · Neurologic  o Denies  o : headache, weakness, loss of consciousness  · Musculoskeletal  o Denies  o : painful, swollen joints  · Psychiatric  o Denies  o : drug/alcohol addiction, anxiety, depression      Vitals  Date Time BP Position Site L\R Cuff Size HR RR TEMP (F) WT  HT  BMI kg/m2 BSA m2 O2 Sat FR L/min FiO2 HC       04/09/2021 01:33 PM      97 - R   243lbs 16oz 5'  4\" 41.88 2.24 99 %            Physical Examination  · Constitutional  o Appearance  o : well developed, well-nourished, no obvious deformities present  · Head and Face  o Head  o :   § Inspection  § : normocephalic  o Face  o :   § Inspection  § : no facial lesions  · Eyes  o Conjunctivae  o : conjunctivae normal  o Sclerae  o : sclerae white  · Ears, Nose, Mouth and Throat  o Ears  o :   § External Ears  § : appearance within normal limits  § Hearing  § : intact  o Nose  o :   § External Nose  § : appearance normal  · Neck  o Inspection/Palpation  o : normal appearance  o Range of Motion  o : full range of motion  · Respiratory  o Respiratory Effort  o : breathing unlabored  o Inspection of Chest  o : normal appearance  o Auscultation of Lungs  o : no audible wheezing or rales  · Cardiovascular  o Heart  o : regular rate  · Gastrointestinal  o Abdominal Examination  o : soft and non-tender  · Skin and Subcutaneous Tissue  o General Inspection  o : intact, no rashes  · Psychiatric  o General  o : Alert and oriented x3  o Judgement and Insight  o : judgment and insight intact  o Mood and Affect  o : mood normal, affect appropriate  · Left Knee  o Inspection  o : ROM 0-100 degrees without a hard mechanical block. The knee is otherwise normal anatomic and atraumatic good scar formation that is supple.           Assessment  · Aftercare;following Left " Total Knee Arthroplasty     V54.81/Z47.1  · Left knee pain, unspecified chronicity     719.46/M25.562      Plan  · Medications  o Medications have been Reconciled  o Transition of Care or Provider Policy  · Instructions  o Reviewed the patient's Past Medical, Social, and Family history as well as the ROS at today's visit, no changes.  o Call or return if worsening symptoms.  o This note was transcribed by Jazmyn Watson. /jessica.  o Discussed the treatment plan with the patient. Plan to follow up in 3 months for further evaluation. Patient is to continue to work on ROM specifically with flexion of the knee as well as RICE therapy.   o Electronically Identified Patient Education Materials Provided Electronically            Electronically Signed by: Jazmyn Watson MA -Author on April 12, 2021 02:30:06 PM  Electronically Co-signed by: Dada Torres MD -Reviewer on April 12, 2021 09:17:06 PM  Electronically Co-signed by: Jeremy Oneill PA-C -Reviewer on April 13, 2021 12:59:50 PM

## 2021-05-14 NOTE — PROGRESS NOTES
Progress Note      Patient Name: Wilner Jacobson   Patient ID: [id number]   Sex: Female   YOB: 1955    Primary Care Provider: Abrahan Rick DO   Referring Provider: Abrahan Wilson MD    Visit Date: February 24, 2021    Provider: Iva Alan PA-C   [facility]   Location Address: [address] [place] [address]  [id number]   Location Phone: [phone]          Chief Complaint  · Follow up left total knee      History Of Present Illness  Wilner Jacobson is a 65 year old /Black female who presents today to [facility].      She is s/p left TKA 12/1/20 by Dr. Torres. She is making progress in PT. Her flexion is improving.             Past Medical History  Arthritis; Bladder Disorder; Diabetes; Hematuria; High cholesterol; Hyperlipemia; Hypertension; Renal Cyst         Past Surgical History  Cystoscopy; I have had no surgeries         Medication List  cyclobenzaprine 5 mg oral tablet; diclofenac sodium 1 % topical gel; hydrochlorothiazide 25 mg oral tablet; ibuprofen 600 mg oral tablet; ibuprofen 800 mg oral tablet; lisinopril 40 mg oral tablet; meclizine 25 mg oral tablet; Norco 7.5-325 mg oral tablet; simvastatin 20 mg oral tablet; tizanidine 4 mg oral tablet         Allergy List  NO KNOWN DRUG ALLERGIES       Allergies Reconciled  Family Medical History  Diabetes, unspecified type; *Non Contributory; Family history of certain chronic disabling diseases; arthritis; Family history of Arthritis         Social History  Alcohol Use (Current some day); lives with spouse; .; Recreational Drug Use (Never); Retired.; Tobacco (Never); Working         Review of Systems  · Constitutional  o Denies  o : fever, chills, weight loss  · Cardiovascular  o Denies  o : chest pain, shortness of breath  · Gastrointestinal  o Denies  o : liver disease, heartburn, nausea, blood in stools  · Genitourinary  o Denies  o : painful urination, blood in  "urine  · Integument  o Denies  o : rash, itching  · Neurologic  o Denies  o : headache, weakness, loss of consciousness  · Musculoskeletal  o Admits  o : painful, swollen joints  · Psychiatric  o Denies  o : drug/alcohol addiction, anxiety, depression      Vitals  Date Time BP Position Site L\R Cuff Size HR RR TEMP (F) WT  HT  BMI kg/m2 BSA m2 O2 Sat FR L/min FiO2        02/24/2021 10:35 AM      93 - R   239lbs 0oz 5'  4\" 41.02 2.21 97 %            Physical Examination  · Constitutional  o Appearance  o : well developed, well-nourished, no obvious deformities present  · Head and Face  o Head  o :   § Inspection  § : normocephalic  o Face  o :   § Inspection  § : no facial lesions  · Eyes  o Conjunctivae  o : conjunctivae normal  o Sclerae  o : sclerae white  · Ears, Nose, Mouth and Throat  o Ears  o :   § External Ears  § : appearance within normal limits  § Hearing  § : intact  o Nose  o :   § External Nose  § : appearance normal  · Neck  o Inspection/Palpation  o : normal appearance  o Range of Motion  o : full range of motion  · Respiratory  o Respiratory Effort  o : breathing unlabored  o Inspection of Chest  o : normal appearance  o Auscultation of Lungs  o : no audible wheezing or rales  · Cardiovascular  o Heart  o : regular rate  · Gastrointestinal  o Abdominal Examination  o : soft and non-tender  · Skin and Subcutaneous Tissue  o General Inspection  o : intact, no rashes  · Psychiatric  o General  o : Alert and oriented x3  o Judgement and Insight  o : judgment and insight intact  o Mood and Affect  o : mood normal, affect appropriate  · Left Knee  o Inspection  o : Well healed incision. Extension 5 degrees. Flexion 105 degrees. Patella well tracking. Calf supple/nontender. Negative Adenike's. Ambulates with cane. Neurovascularly intact.   · In Office Procedures  o View  o : AP/LATERAL/SUNRISE   o Site  o : left, knee  o Indication  o : Left knee pain   o Study  o : X-rays ordered, taken in the office, and " reviewed today.  o Xray  o : Well aligned left TKA  o Comparative Data  o : Comparative Data found and reviewed today           Assessment  · Aftercare following left knee joint replacement surgery       Aftercare following joint replacement surgery     V54.81/Z47.1  Presence of left artificial knee joint     V54.81/Z96.652  · Pain: Knee     719.46/M25.569      Plan  · Orders  o Knee (Left) [facility] Preferred View ([id number]) - 719.46/M25.569 - 02/24/2021  · Medications  o Medications have been Reconciled  o Transition of Care or Provider Policy  · Instructions  o Reviewed the patient's Past Medical, Social, and Family history as well as the ROS at today's visit, no changes.  o Call or return if worsening symptoms.  o Continue PT, focusing on flexion. Follow up in 6 weeks.  o Electronically Identified Patient Education Materials Provided Electronically            Electronically Signed by: Iva Alan PA-C -Author on February 24, 2021 12:54:18 PM

## 2021-05-14 NOTE — PROGRESS NOTES
Progress Note      Patient Name: Wilner Jacobson   Patient ID: [id number]   Sex: Female   YOB: 1955    Referring Provider: Abrahan Wilson MD    Visit Date: January 15, 2021    Provider: Iva Alan PA-C   Location: [facility]   Location Address: [address] [place] [address]  [id number]   Location Phone: [phone]          Chief Complaint  · Follow up left knee replacement      History Of Present Illness  Wilner Jacobson is a 65 year old /Black female who presents today to [facility].      She is s/p left TKA 12/1/20 by Dr. Torres. She is making progress in PT.       Past Medical History  Arthritis; Bladder Disorder; Diabetes; Hematuria; High cholesterol; Hyperlipemia; Hypertension; Renal Cyst         Past Surgical History  Cystoscopy; I have had no surgeries         Medication List  cyclobenzaprine 5 mg oral tablet; diclofenac sodium 1 % topical gel; hydrochlorothiazide 25 mg oral tablet; ibuprofen 800 mg oral tablet; Keflex 500 mg oral capsule; lisinopril 40 mg oral tablet; meclizine 25 mg oral tablet; Norco 7.5-325 mg oral tablet; simvastatin 20 mg oral tablet         Allergy List  NO KNOWN DRUG ALLERGIES       Allergies Reconciled  Family Medical History  Diabetes, unspecified type; *Non Contributory; Family history of certain chronic disabling diseases; arthritis; Family history of Arthritis         Social History  Alcohol Use (Current some day); lives with spouse; .; Recreational Drug Use (Never); Retired.; Tobacco (Never); Working         Review of Systems  · Constitutional  o Denies  o : fever, chills, weight loss  · Cardiovascular  o Denies  o : chest pain, shortness of breath  · Gastrointestinal  o Denies  o : liver disease, heartburn, nausea, blood in stools  · Genitourinary  o Denies  o : painful urination, blood in urine  · Integument  o Denies  o : rash, itching  · Neurologic  o Denies  o : headache, weakness, loss of  "consciousness  · Musculoskeletal  o Admits  o : painful, swollen joints  · Psychiatric  o Denies  o : drug/alcohol addiction, anxiety, depression      Vitals  Date Time BP Position Site L\R Cuff Size HR RR TEMP (F) WT  HT  BMI kg/m2 BSA m2 O2 Sat FR L/min FiO2 HC       01/15/2021 02:14 PM      76 - R   253lbs 16oz 5'  4\" 43.6 2.28 99 %            Physical Examination  · Constitutional  o Appearance  o : well developed, well-nourished, no obvious deformities present  · Head and Face  o Head  o :   § Inspection  § : normocephalic  o Face  o :   § Inspection  § : no facial lesions  · Eyes  o Conjunctivae  o : conjunctivae normal  o Sclerae  o : sclerae white  · Ears, Nose, Mouth and Throat  o Ears  o :   § External Ears  § : appearance within normal limits  § Hearing  § : intact  o Nose  o :   § External Nose  § : appearance normal  · Neck  o Inspection/Palpation  o : normal appearance  o Range of Motion  o : full range of motion  · Respiratory  o Respiratory Effort  o : breathing unlabored  o Inspection of Chest  o : normal appearance  o Auscultation of Lungs  o : no audible wheezing or rales  · Cardiovascular  o Heart  o : regular rate  · Gastrointestinal  o Abdominal Examination  o : soft and non-tender  · Skin and Subcutaneous Tissue  o General Inspection  o : intact, no rashes  · Psychiatric  o General  o : Alert and oriented x3  o Judgement and Insight  o : judgment and insight intact  o Mood and Affect  o : mood normal, affect appropriate  · Left Knee  o Inspection  o : Well healed incision. Extension 5 degrees. Flexion  degrees. Patella well tracking. Calf supple/nontender. Negative Adenike's. Ambulates with cane. Neurovascularly intact.           Assessment  · Aftercare following left knee joint replacement surgery       Aftercare following joint replacement surgery     V54.81/Z47.1  Presence of left artificial knee joint     V54.81/Z96.652  · Pain: " Knee     719.46/M25.569      Plan  · Medications  o Medications have been Reconciled  o Transition of Care or Provider Policy  · Instructions  o Reviewed the patient's Past Medical, Social, and Family history as well as the ROS at today's visit, no changes.  o Call or return if worsening symptoms.            Electronically Signed by: Iva Alan PA-C -Author on January 15, 2021 02:53:10 PM

## 2021-05-22 ENCOUNTER — TRANSCRIBE ORDERS (OUTPATIENT)
Dept: ADMINISTRATIVE | Facility: HOSPITAL | Age: 66
End: 2021-05-22

## 2021-05-22 DIAGNOSIS — Z12.31 SCREENING MAMMOGRAM, ENCOUNTER FOR: Primary | ICD-10-CM

## 2021-05-24 ENCOUNTER — OFFICE VISIT CONVERTED (OUTPATIENT)
Dept: FAMILY MEDICINE CLINIC | Facility: CLINIC | Age: 66
End: 2021-05-24
Attending: FAMILY MEDICINE

## 2021-05-24 ENCOUNTER — HOSPITAL ENCOUNTER (OUTPATIENT)
Dept: FAMILY MEDICINE CLINIC | Facility: CLINIC | Age: 66
Discharge: HOME | End: 2021-05-24
Attending: FAMILY MEDICINE

## 2021-05-24 LAB
ALBUMIN SERPL-MCNC: 4.4 G/DL (ref 3.5–5)
ALBUMIN/GLOB SERPL: 1.3 {RATIO} (ref 1.4–2.6)
ALP SERPL-CCNC: 155 U/L (ref 43–160)
ALT SERPL-CCNC: 17 U/L (ref 10–40)
ANION GAP SERPL CALC-SCNC: 16 MMOL/L (ref 8–19)
APPEARANCE UR: CLEAR
AST SERPL-CCNC: 20 U/L (ref 15–50)
BASOPHILS # BLD AUTO: 0.02 10*3/UL (ref 0–0.2)
BASOPHILS NFR BLD AUTO: 0.4 % (ref 0–3)
BILIRUB SERPL-MCNC: 0.35 MG/DL (ref 0.2–1.3)
BILIRUB UR QL: NEGATIVE
BUN SERPL-MCNC: 17 MG/DL (ref 5–25)
BUN/CREAT SERPL: 16 {RATIO} (ref 6–20)
CALCIUM SERPL-MCNC: 9.3 MG/DL (ref 8.7–10.4)
CHLORIDE SERPL-SCNC: 100 MMOL/L (ref 99–111)
COLOR UR: YELLOW
CONV ABS IMM GRAN: 0.01 10*3/UL (ref 0–0.2)
CONV BACTERIA: ABNORMAL
CONV CO2: 28 MMOL/L (ref 22–32)
CONV COLLECTION SOURCE (UA): ABNORMAL
CONV CREATININE URINE, RANDOM: 93 MG/DL (ref 10–300)
CONV IMMATURE GRAN: 0.2 % (ref 0–1.8)
CONV PROTEIN TO CREATININE RATIO (RANDOM URINE): 0.07 {RATIO} (ref 0–0.1)
CONV TOTAL PROTEIN: 7.8 G/DL (ref 6.3–8.2)
CONV UROBILINOGEN IN URINE BY AUTOMATED TEST STRIP: 1 {EHRLICHU}/DL (ref 0.1–1)
CREAT UR-MCNC: 1.06 MG/DL (ref 0.5–0.9)
DEPRECATED RDW RBC AUTO: 45.4 FL (ref 36.4–46.3)
EOSINOPHIL # BLD AUTO: 0.11 10*3/UL (ref 0–0.7)
EOSINOPHIL # BLD AUTO: 2.4 % (ref 0–7)
ERYTHROCYTE [DISTWIDTH] IN BLOOD BY AUTOMATED COUNT: 14 % (ref 11.7–14.4)
EST. AVERAGE GLUCOSE BLD GHB EST-MCNC: 123 MG/DL
GFR SERPLBLD BASED ON 1.73 SQ M-ARVRAT: >60 ML/MIN/{1.73_M2}
GLOBULIN UR ELPH-MCNC: 3.4 G/DL (ref 2–3.5)
GLUCOSE SERPL-MCNC: 94 MG/DL (ref 65–99)
GLUCOSE UR QL: NEGATIVE MG/DL
HBA1C MFR BLD: 5.9 % (ref 3.5–5.7)
HCT VFR BLD AUTO: 40.4 % (ref 37–47)
HGB BLD-MCNC: 12.5 G/DL (ref 12–16)
HGB UR QL STRIP: ABNORMAL
KETONES UR QL STRIP: NEGATIVE MG/DL
LEUKOCYTE ESTERASE UR QL STRIP: NEGATIVE
LYMPHOCYTES # BLD AUTO: 1.46 10*3/UL (ref 1–5)
LYMPHOCYTES NFR BLD AUTO: 32.4 % (ref 20–45)
MCH RBC QN AUTO: 27.5 PG (ref 27–31)
MCHC RBC AUTO-ENTMCNC: 30.9 G/DL (ref 33–37)
MCV RBC AUTO: 89 FL (ref 81–99)
MONOCYTES # BLD AUTO: 0.34 10*3/UL (ref 0.2–1.2)
MONOCYTES NFR BLD AUTO: 7.5 % (ref 3–10)
NEUTROPHILS # BLD AUTO: 2.57 10*3/UL (ref 2–8)
NEUTROPHILS NFR BLD AUTO: 57.1 % (ref 30–85)
NITRITE UR QL STRIP: NEGATIVE
NRBC CBCN: 0 % (ref 0–0.7)
OSMOLALITY SERPL CALC.SUM OF ELEC: 291 MOSM/KG (ref 273–304)
PH UR STRIP.AUTO: 5 [PH] (ref 5–8)
PHOSPHATE SERPL-MCNC: 3.3 MG/DL (ref 2.4–4.5)
PLATELET # BLD AUTO: 326 10*3/UL (ref 130–400)
PMV BLD AUTO: 11.5 FL (ref 9.4–12.3)
POTASSIUM SERPL-SCNC: 3.8 MMOL/L (ref 3.5–5.3)
PROT UR QL: NEGATIVE MG/DL
PROT UR-MCNC: 6.6 MG/DL
RBC # BLD AUTO: 4.54 10*6/UL (ref 4.2–5.4)
RBC #/AREA URNS HPF: ABNORMAL /[HPF]
SODIUM SERPL-SCNC: 140 MMOL/L (ref 135–147)
SP GR UR: 1.02 (ref 1–1.03)
WBC # BLD AUTO: 4.51 10*3/UL (ref 4.8–10.8)
WBC #/AREA URNS HPF: ABNORMAL /[HPF]

## 2021-06-05 NOTE — PROGRESS NOTES
Progress Note      Patient Name: Wilner Jacobson   Patient ID: [id number]   Sex: Female   YOB: 1955    Primary Care Provider: Abrahan Rick DO   Referring Provider: Abrahan Rick DO    Visit Date: May 24, 2021    Provider: Abrahan Rick DO   [place] [facility] [place]   Location Address: [address] [place] [address], [address]  [id number]   Location Phone: [phone]          Chief Complaint  · requesting labs  · follow up low back pain      History Of Present Illness  Wilner Jacobson is a 65 year old /Black female who presents for evaluation and treatment of:      Patient presents today requesting labs.  She is seeing Dr. Mukherjee and has lab orders.  She is still being seen by orthopedics for left knee arthroplasty.  She is due for colonoscopy.  Her last one was normal done by Dr. Mahajan in 2011.  I will place follow-up colonoscopy order.  She will have her mammogram on 7/19/2021.  She would like to think about pneumococcal vaccination as I did offer her the series today.  She has low back pain and tizanidine has been helping.  She has degenerative changes noted on her x-ray.  She has moderate degenerative disc disease in the lumbar spine.  She has 5 mm anteriolisthesis of L3 on L4 and 8 mm anteriolisthesis of L4 on L5.       Past Medical History  Arthritis; Bladder disorder; Diabetes; Hematuria; High cholesterol; Hyperlipemia; Hypertension; Renal Cyst         Past Surgical History  Cystoscopy; Knee replacement, left         Medication List  atorvastatin 40 mg oral tablet; cyclobenzaprine 5 mg oral tablet; diclofenac sodium 1 % topical gel; hydrochlorothiazide 25 mg oral tablet; ibuprofen 600 mg oral tablet; ibuprofen 800 mg oral tablet; lisinopril 40 mg oral tablet; meclizine 25 mg oral tablet; Norco 7.5-325 mg oral tablet         Allergy List  NO KNOWN DRUG ALLERGIES         Family Medical History  Diabetes, unspecified type; *Non Contributory; Family  "history of certain chronic disabling diseases; arthritis; Family history of Arthritis         Social History  Alcohol Use (Current some day); lives with spouse; .; Recreational Drug Use (Never); Retired.; Tobacco (Never); Working         Immunizations  Name Date Admin   Influenza Refused         Review of Systems     Gen: Denies any fever, chills, or weight changes  Extremities: Denies edema  Gastrointestinal: She reports issues with constipation.  She stools about 2 or 3 times a week.  Denies any hard stools  Psychiatric: Denies any changes in mood or affect  Musculoskeletal: As discussed above  Neurologic: Denies any deficits  Skin: Denies any rashes       Vitals  Date Time BP Position Site L\R Cuff Size HR RR TEMP (F) WT  HT  BMI kg/m2 BSA m2 O2 Sat FR L/min FiO2 HC       05/24/2021 11:15 /66 Sitting    74 - R  98.1 242lbs 5oz 5'  4\" 41.59 2.23 99 %            Physical Examination     General: AAO 3, no acute distress, pleasant  HEENT: Normocephalic, atraumatic  Cardiovascular: Regular rate and rhythm without appreciable murmur  Respiratory: Clear to auscultation bilaterally no RRW  Gastrointestinal: Soft nontender nondistended with bowel sounds present  extremities: No edema  Neurologic: CN II through XII grossly intact   Psychiatric: Normal mood and affect           Assessment  · Diabetes mellitus, type 2     250.00/E11.9  · Screening for colon cancer     V76.51/Z12.11  · Low back pain     724.2/M54.5  · Left knee pain     719.46/M25.562  · HLD (hyperlipidemia)     272.4/E78.5  · Hypertension     401.9/I10  · Medication monitoring encounter     V58.83/Z51.81  · Constipation     564.00/K59.00      Plan  · Orders  o COLONOSCOPY REFERRAL (COLON) - V76.51/Z12.11 - 05/24/2021   Last colonoscopy was 5/2011. Needs 10 year follow up. Last one was normal  o CMP [facility] (97559) - 401.9/I10, V58.83/Z51.81, 250.00/E11.9 - 05/24/2021  o Hgb A1c [facility] (08501) - V58.83/Z51.81, 250.00/E11.9 - 05/24/2021  o ACO-39: " Current medications updated and reviewed (, 1159F) - - 05/24/2021  · Medications  o hydrochlorothiazide 25 mg oral tablet   SIG: take 1 tablet (25 mg) by oral route once daily for blood pressure   DISP: (14) Tablet with 0 refills  Adjusted on 05/24/2021     o lisinopril 40 mg oral tablet   SIG: take 1 tablet (40 mg) by oral route once daily for 14 days   DISP: (14) Tablet with 0 refills  Refilled on 05/24/2021     · Instructions  o Patient was educated/instructed on their diagnosis, treatment and medications prior to discharge from the clinic today.  o Patient instructed to seek medical attention urgently for new or worsening symptoms.  o Call the office with any concerns or questions.  o Discussed continue with tizanidine. She may use a half of the 4 mg tablet as she is having some drowsiness with taking the medication. Discussed having her use Metamucil for constipation. Colonoscopy has been ordered. I will see her back in 3 months or sooner if needed. Patient instructed to call with any questions or concerns.  · Disposition  o Follow Up in 3 months.            Electronically Signed by: Abrahan Rick DO -Author on May 24, 2021 12:29:11 PM

## 2021-07-15 VITALS
HEIGHT: 64 IN | TEMPERATURE: 98.1 F | SYSTOLIC BLOOD PRESSURE: 108 MMHG | OXYGEN SATURATION: 99 % | BODY MASS INDEX: 41.37 KG/M2 | HEART RATE: 74 BPM | DIASTOLIC BLOOD PRESSURE: 66 MMHG | WEIGHT: 242.31 LBS

## 2021-07-19 ENCOUNTER — HOSPITAL ENCOUNTER (OUTPATIENT)
Dept: MAMMOGRAPHY | Facility: HOSPITAL | Age: 66
Discharge: HOME | End: 2021-07-19
Admitting: OBSTETRICS & GYNECOLOGY

## 2021-07-19 DIAGNOSIS — Z12.31 SCREENING MAMMOGRAM, ENCOUNTER FOR: ICD-10-CM

## 2021-07-19 PROCEDURE — 77063 BREAST TOMOSYNTHESIS BI: CPT

## 2021-07-19 PROCEDURE — 77067 SCR MAMMO BI INCL CAD: CPT

## 2021-08-02 ENCOUNTER — OFFICE VISIT (OUTPATIENT)
Dept: ORTHOPEDIC SURGERY | Facility: CLINIC | Age: 66
End: 2021-08-02

## 2021-08-02 VITALS — HEART RATE: 78 BPM | BODY MASS INDEX: 43.16 KG/M2 | OXYGEN SATURATION: 97 % | HEIGHT: 63 IN | WEIGHT: 243.6 LBS

## 2021-08-02 DIAGNOSIS — Z96.652 HISTORY OF TOTAL LEFT KNEE REPLACEMENT: Primary | ICD-10-CM

## 2021-08-02 PROCEDURE — 99213 OFFICE O/P EST LOW 20 MIN: CPT | Performed by: ORTHOPAEDIC SURGERY

## 2021-08-02 RX ORDER — ATORVASTATIN CALCIUM 40 MG/1
40 TABLET, FILM COATED ORAL DAILY
COMMUNITY
Start: 2021-07-31 | End: 2022-02-07 | Stop reason: SDUPTHER

## 2021-08-02 RX ORDER — LISINOPRIL 40 MG/1
40 TABLET ORAL DAILY
COMMUNITY
Start: 2021-05-19 | End: 2021-08-30 | Stop reason: SDUPTHER

## 2021-08-02 RX ORDER — HYDROCHLOROTHIAZIDE 25 MG/1
25 TABLET ORAL DAILY
COMMUNITY
Start: 2021-07-31 | End: 2021-11-04 | Stop reason: SDUPTHER

## 2021-08-02 NOTE — PROGRESS NOTES
"Chief Complaint  Follow-up of the Left Knee     Subjective      Wilner Jacobson presents to [facility] for a follow-up of left knee. Patient is s/p s/p left TKA 12/1/20 by Dr. Torres. She has finished out therapy about 1 month ago. She states sometimes she gets a slight stinging sensation about her incisions. Patient reports doing her therapy exercises at home. She states she is happy with the results of her left total knee.     No Known Allergies     Social History     Socioeconomic History   • Marital status:      Spouse name: Not on file   • Number of children: Not on file   • Years of education: Not on file   • Highest education level: Not on file   Tobacco Use   • Smoking status: Never Smoker   • Smokeless tobacco: Never Used   Substance and Sexual Activity   • Alcohol use: Yes     Comment: occasionally drinks, has been drinking for 31 or more years    • Drug use: Never        Review of Systems     Objective   Vital Signs:   Pulse 78   Ht 160 cm (63\")   Wt 110 kg (243 lb 9.6 oz)   SpO2 97%   BMI 43.15 kg/m²       Physical Exam  Constitutional:       Appearance: Normal appearance. He is well-developed and normal weight.   HENT:      Head: Normocephalic.      Right Ear: Hearing and external ear normal.      Left Ear: Hearing and external ear normal.      Nose: Nose normal.   Eyes:      Conjunctiva/sclera: Conjunctivae normal.   Cardiovascular:      Rate and Rhythm: Normal rate.   Pulmonary:      Effort: Pulmonary effort is normal.      Breath sounds: No wheezing or rales.   Abdominal:      Palpations: Abdomen is soft.      Tenderness: There is no abdominal tenderness.   Musculoskeletal:      Cervical back: Normal range of motion.   Skin:     Findings: No rash.   Neurological:      Mental Status: He is alert and oriented to person, place, and time.   Psychiatric:         Mood and Affect: Mood and affect normal.         Judgment: Judgment normal.       Ortho Exam      LEFT KNEE: " Good strength to hamstrings, quadriceps, dorsiflexors and plantar flexors. Sensation grossly intact. Neurovascular intact. Calf supple, non-tender, no signs of DVT. Skin intact. Full weight bearing. Incisions well healed. No signs of infection. Dorsal Pedal Pulse 2+, posteriror tibialis pulse 2+. Full extension. Non-tender medial and lateral joint line. Well tracking patella. Flexion to 110 degrees.  No swelling or skin discoloration. Non-antalgic gait.       Procedures        Imaging Results (Most Recent)     Procedure Component Value Units Date/Time    XR Knee 3 View Left [[id number]] Resulted: 08/02/21 1123     Updated: 08/02/21 1126           Result Review :       X-Ray Report:  Left knee(s) X-Ray  Indication: Evaluation of left knee   AP, Lateral and Standing view(s)  Findings: Intact left total knee arthroplasty with appropriate alignment and no signs of wearing or loosening.   Prior studies available for comparison: no     Assessment and Plan     DX: S/p left total knee arthroplasty     Discussed treatment plans with the patient. She will continue at home exercises. She may progress back into activities fully.     Call or return if worsening symptoms.    Follow Up     1 year.       Patient was given instructions and counseling regarding her condition or for health maintenance advice. Please see specific information pulled into the AVS if appropriate.     Scribed for Dada Torres MD by Kimmy Cote.  08/02/21   11:32 EDT    I have personally performed the services described in this document as scribed by the above individual and it is both accurate and complete. Dada Torres MD 08/04/21

## 2021-08-30 ENCOUNTER — TELEPHONE (OUTPATIENT)
Dept: ORTHOPEDIC SURGERY | Facility: CLINIC | Age: 66
End: 2021-08-30

## 2021-08-30 DIAGNOSIS — Z96.652 HISTORY OF TOTAL LEFT KNEE REPLACEMENT: Primary | ICD-10-CM

## 2021-08-30 RX ORDER — LISINOPRIL 40 MG/1
40 TABLET ORAL DAILY
Qty: 90 TABLET | Refills: 3 | Status: SHIPPED | OUTPATIENT
Start: 2021-08-30 | End: 2021-11-04 | Stop reason: SDUPTHER

## 2021-08-30 RX ORDER — AMOXICILLIN 500 MG/1
CAPSULE ORAL
Qty: 4 CAPSULE | Refills: 0 | Status: SHIPPED | OUTPATIENT
Start: 2021-08-30 | End: 2021-11-04

## 2021-08-30 NOTE — TELEPHONE ENCOUNTER
PATIENT CALLED AND NEEDS PRE MED FOR DENTAL APPOINTMENT TOMORROW, 8/31.  [facility].

## 2021-08-30 NOTE — TELEPHONE ENCOUNTER
Caller: Wilner Jacobson    Relationship: Self    Best call back number: [phone]    Medication needed:   Requested Prescriptions     Pending Prescriptions Disp Refills   • lisinopril (PRINIVIL,ZESTRIL) 40 MG tablet       Sig: Take 1 tablet by mouth Daily.       When do you need the refill by: 08/30/2021    What additional details did the patient provide when requesting the medication: PATIENT WAS WANTING TO GET A 7 DAY SUPPLY WHILE SHE WAITS FOR HER MAIL ORDER TO COME IN     Does the patient have less than a 3 day supply:  [x] Yes  [] No    What is the patient's preferred pharmacy: [facility] #[id number] - [place] - [address]  [phone] [facility] [phone] FX

## 2021-11-02 ENCOUNTER — TELEPHONE (OUTPATIENT)
Dept: FAMILY MEDICINE CLINIC | Facility: CLINIC | Age: 66
End: 2021-11-02

## 2021-11-02 ENCOUNTER — LAB (OUTPATIENT)
Dept: FAMILY MEDICINE CLINIC | Facility: CLINIC | Age: 66
End: 2021-11-02

## 2021-11-02 DIAGNOSIS — N18.9 CHRONIC KIDNEY DISEASE, UNSPECIFIED CKD STAGE: Primary | ICD-10-CM

## 2021-11-02 LAB
ALBUMIN SERPL-MCNC: 4.2 G/DL (ref 3.5–5.2)
ALBUMIN SERPL-MCNC: 4.2 G/DL (ref 3.5–5.2)
ALBUMIN/GLOB SERPL: 1.4 G/DL
ALP SERPL-CCNC: 152 U/L (ref 39–117)
ALT SERPL W P-5'-P-CCNC: 14 U/L (ref 1–33)
ANION GAP SERPL CALCULATED.3IONS-SCNC: 10.2 MMOL/L (ref 5–15)
ANION GAP SERPL CALCULATED.3IONS-SCNC: 6.4 MMOL/L (ref 5–15)
AST SERPL-CCNC: 17 U/L (ref 1–32)
BACTERIA UR QL AUTO: NORMAL /HPF
BASOPHILS # BLD AUTO: 0.03 10*3/MM3 (ref 0–0.2)
BASOPHILS # BLD AUTO: 0.04 10*3/MM3 (ref 0–0.2)
BASOPHILS NFR BLD AUTO: 0.7 % (ref 0–1.5)
BASOPHILS NFR BLD AUTO: 0.9 % (ref 0–1.5)
BILIRUB SERPL-MCNC: 0.3 MG/DL (ref 0–1.2)
BILIRUB UR QL STRIP: NEGATIVE
BUN SERPL-MCNC: 16 MG/DL (ref 8–23)
BUN SERPL-MCNC: 16 MG/DL (ref 8–23)
BUN/CREAT SERPL: 17.8 (ref 7–25)
BUN/CREAT SERPL: 19.3 (ref 7–25)
CALCIUM SPEC-SCNC: 9 MG/DL (ref 8.6–10.5)
CALCIUM SPEC-SCNC: 9.2 MG/DL (ref 8.6–10.5)
CHLORIDE SERPL-SCNC: 100 MMOL/L (ref 98–107)
CHLORIDE SERPL-SCNC: 99 MMOL/L (ref 98–107)
CLARITY UR: CLEAR
CO2 SERPL-SCNC: 28.8 MMOL/L (ref 22–29)
CO2 SERPL-SCNC: 29.6 MMOL/L (ref 22–29)
COLOR UR: YELLOW
CREAT SERPL-MCNC: 0.83 MG/DL (ref 0.57–1)
CREAT SERPL-MCNC: 0.9 MG/DL (ref 0.57–1)
CRP SERPL-MCNC: 0.67 MG/DL (ref 0–0.5)
DEPRECATED RDW RBC AUTO: 40.8 FL (ref 37–54)
DEPRECATED RDW RBC AUTO: 42.5 FL (ref 37–54)
EOSINOPHIL # BLD AUTO: 0.08 10*3/MM3 (ref 0–0.4)
EOSINOPHIL # BLD AUTO: 0.09 10*3/MM3 (ref 0–0.4)
EOSINOPHIL NFR BLD AUTO: 1.9 % (ref 0.3–6.2)
EOSINOPHIL NFR BLD AUTO: 2.1 % (ref 0.3–6.2)
ERYTHROCYTE [DISTWIDTH] IN BLOOD BY AUTOMATED COUNT: 13.2 % (ref 12.3–15.4)
ERYTHROCYTE [DISTWIDTH] IN BLOOD BY AUTOMATED COUNT: 13.2 % (ref 12.3–15.4)
GFR SERPL CREATININE-BSD FRML MDRD: 76 ML/MIN/1.73
GFR SERPL CREATININE-BSD FRML MDRD: 83 ML/MIN/1.73
GLOBULIN UR ELPH-MCNC: 3 GM/DL
GLUCOSE SERPL-MCNC: 92 MG/DL (ref 65–99)
GLUCOSE SERPL-MCNC: 94 MG/DL (ref 65–99)
GLUCOSE UR STRIP-MCNC: NEGATIVE MG/DL
HBA1C MFR BLD: 6 % (ref 4.8–5.6)
HCT VFR BLD AUTO: 36.4 % (ref 34–46.6)
HCT VFR BLD AUTO: 38 % (ref 34–46.6)
HGB BLD-MCNC: 12 G/DL (ref 12–15.9)
HGB BLD-MCNC: 12.2 G/DL (ref 12–15.9)
HGB UR QL STRIP.AUTO: ABNORMAL
HYALINE CASTS UR QL AUTO: NORMAL /LPF
IMM GRANULOCYTES # BLD AUTO: 0.01 10*3/MM3 (ref 0–0.05)
IMM GRANULOCYTES # BLD AUTO: 0.01 10*3/MM3 (ref 0–0.05)
IMM GRANULOCYTES NFR BLD AUTO: 0.2 % (ref 0–0.5)
IMM GRANULOCYTES NFR BLD AUTO: 0.2 % (ref 0–0.5)
KETONES UR QL STRIP: NEGATIVE
LEUKOCYTE ESTERASE UR QL STRIP.AUTO: NEGATIVE
LYMPHOCYTES # BLD AUTO: 1.35 10*3/MM3 (ref 0.7–3.1)
LYMPHOCYTES # BLD AUTO: 1.41 10*3/MM3 (ref 0.7–3.1)
LYMPHOCYTES NFR BLD AUTO: 32.1 % (ref 19.6–45.3)
LYMPHOCYTES NFR BLD AUTO: 33.3 % (ref 19.6–45.3)
MCH RBC QN AUTO: 27.5 PG (ref 26.6–33)
MCH RBC QN AUTO: 28.3 PG (ref 26.6–33)
MCHC RBC AUTO-ENTMCNC: 31.6 G/DL (ref 31.5–35.7)
MCHC RBC AUTO-ENTMCNC: 33.5 G/DL (ref 31.5–35.7)
MCV RBC AUTO: 84.5 FL (ref 79–97)
MCV RBC AUTO: 87 FL (ref 79–97)
MONOCYTES # BLD AUTO: 0.32 10*3/MM3 (ref 0.1–0.9)
MONOCYTES # BLD AUTO: 0.37 10*3/MM3 (ref 0.1–0.9)
MONOCYTES NFR BLD AUTO: 7.5 % (ref 5–12)
MONOCYTES NFR BLD AUTO: 8.8 % (ref 5–12)
NEUTROPHILS NFR BLD AUTO: 2.35 10*3/MM3 (ref 1.7–7)
NEUTROPHILS NFR BLD AUTO: 2.38 10*3/MM3 (ref 1.7–7)
NEUTROPHILS NFR BLD AUTO: 56.1 % (ref 42.7–76)
NEUTROPHILS NFR BLD AUTO: 56.2 % (ref 42.7–76)
NITRITE UR QL STRIP: NEGATIVE
NRBC BLD AUTO-RTO: 0 /100 WBC (ref 0–0.2)
NRBC BLD AUTO-RTO: 0 /100 WBC (ref 0–0.2)
PH UR STRIP.AUTO: 6 [PH] (ref 5–8)
PHOSPHATE SERPL-MCNC: 2.9 MG/DL (ref 2.5–4.5)
PLATELET # BLD AUTO: 278 10*3/MM3 (ref 140–450)
PLATELET # BLD AUTO: 279 10*3/MM3 (ref 140–450)
PMV BLD AUTO: 11.5 FL (ref 6–12)
PMV BLD AUTO: 11.9 FL (ref 6–12)
POTASSIUM SERPL-SCNC: 3.6 MMOL/L (ref 3.5–5.2)
POTASSIUM SERPL-SCNC: 3.6 MMOL/L (ref 3.5–5.2)
PROT SERPL-MCNC: 7.2 G/DL (ref 6–8.5)
PROT UR QL STRIP: NEGATIVE
RBC # BLD AUTO: 4.31 10*6/MM3 (ref 3.77–5.28)
RBC # BLD AUTO: 4.37 10*6/MM3 (ref 3.77–5.28)
RBC # UR: NORMAL /HPF
REF LAB TEST METHOD: NORMAL
SODIUM SERPL-SCNC: 136 MMOL/L (ref 136–145)
SODIUM SERPL-SCNC: 138 MMOL/L (ref 136–145)
SP GR UR STRIP: 1.01 (ref 1–1.03)
SQUAMOUS #/AREA URNS HPF: NORMAL /HPF
UROBILINOGEN UR QL STRIP: ABNORMAL
WBC # BLD AUTO: 4.2 10*3/MM3 (ref 3.4–10.8)
WBC # BLD AUTO: 4.24 10*3/MM3 (ref 3.4–10.8)
WBC UR QL AUTO: NORMAL /HPF

## 2021-11-02 PROCEDURE — 83036 HEMOGLOBIN GLYCOSYLATED A1C: CPT | Performed by: FAMILY MEDICINE

## 2021-11-02 PROCEDURE — 85025 COMPLETE CBC W/AUTO DIFF WBC: CPT | Performed by: INTERNAL MEDICINE

## 2021-11-02 PROCEDURE — 84100 ASSAY OF PHOSPHORUS: CPT | Performed by: INTERNAL MEDICINE

## 2021-11-02 PROCEDURE — 81001 URINALYSIS AUTO W/SCOPE: CPT | Performed by: INTERNAL MEDICINE

## 2021-11-02 PROCEDURE — 86140 C-REACTIVE PROTEIN: CPT | Performed by: INTERNAL MEDICINE

## 2021-11-02 PROCEDURE — 36415 COLL VENOUS BLD VENIPUNCTURE: CPT | Performed by: FAMILY MEDICINE

## 2021-11-02 PROCEDURE — 80069 RENAL FUNCTION PANEL: CPT | Performed by: INTERNAL MEDICINE

## 2021-11-02 PROCEDURE — 80053 COMPREHEN METABOLIC PANEL: CPT | Performed by: FAMILY MEDICINE

## 2021-11-02 PROCEDURE — 85025 COMPLETE CBC W/AUTO DIFF WBC: CPT | Performed by: FAMILY MEDICINE

## 2021-11-04 ENCOUNTER — OFFICE VISIT (OUTPATIENT)
Dept: FAMILY MEDICINE CLINIC | Facility: CLINIC | Age: 66
End: 2021-11-04

## 2021-11-04 VITALS
BODY MASS INDEX: 42.08 KG/M2 | OXYGEN SATURATION: 100 % | TEMPERATURE: 98 F | WEIGHT: 246.5 LBS | HEART RATE: 70 BPM | HEIGHT: 64 IN | DIASTOLIC BLOOD PRESSURE: 70 MMHG | SYSTOLIC BLOOD PRESSURE: 118 MMHG

## 2021-11-04 DIAGNOSIS — E78.5 HYPERLIPIDEMIA, UNSPECIFIED HYPERLIPIDEMIA TYPE: ICD-10-CM

## 2021-11-04 DIAGNOSIS — R76.8 CENTROMERE ANTIBODY POSITIVE: ICD-10-CM

## 2021-11-04 DIAGNOSIS — M79.671 FOOT PAIN, RIGHT: ICD-10-CM

## 2021-11-04 DIAGNOSIS — M19.90 ARTHRITIS: ICD-10-CM

## 2021-11-04 DIAGNOSIS — I10 PRIMARY HYPERTENSION: Primary | ICD-10-CM

## 2021-11-04 DIAGNOSIS — R31.9 HEMATURIA, UNSPECIFIED TYPE: ICD-10-CM

## 2021-11-04 DIAGNOSIS — M79.672 FOOT PAIN, LEFT: ICD-10-CM

## 2021-11-04 DIAGNOSIS — N18.2 STAGE 2 CHRONIC KIDNEY DISEASE: ICD-10-CM

## 2021-11-04 DIAGNOSIS — E11.43 TYPE 2 DIABETES MELLITUS WITH AUTONOMIC NEUROPATHY, UNSPECIFIED WHETHER LONG TERM INSULIN USE (HCC): ICD-10-CM

## 2021-11-04 DIAGNOSIS — R74.8 ELEVATED ALKALINE PHOSPHATASE LEVEL: ICD-10-CM

## 2021-11-04 DIAGNOSIS — Z12.11 SCREENING FOR COLON CANCER: ICD-10-CM

## 2021-11-04 PROCEDURE — 99214 OFFICE O/P EST MOD 30 MIN: CPT | Performed by: FAMILY MEDICINE

## 2021-11-04 RX ORDER — LISINOPRIL 40 MG/1
40 TABLET ORAL DAILY
Qty: 90 TABLET | Refills: 3 | Status: SHIPPED | OUTPATIENT
Start: 2021-11-04 | End: 2021-12-06 | Stop reason: SDUPTHER

## 2021-11-04 RX ORDER — HYDROCHLOROTHIAZIDE 25 MG/1
25 TABLET ORAL DAILY
Qty: 90 TABLET | Refills: 3 | Status: SHIPPED | OUTPATIENT
Start: 2021-11-04 | End: 2022-05-20

## 2021-11-04 RX ORDER — ATORVASTATIN CALCIUM 40 MG/1
1 TABLET, FILM COATED ORAL DAILY
COMMUNITY
Start: 2021-07-31 | End: 2021-11-04 | Stop reason: SDUPTHER

## 2021-11-04 RX ORDER — MECLIZINE HYDROCHLORIDE 25 MG/1
1 TABLET ORAL
COMMUNITY
End: 2021-11-04

## 2021-11-04 NOTE — PROGRESS NOTES
"Chief Complaint  Hypertension  CKD  Follow up on lab work  Bilateral foot pain  Needs colonoscopy  Subjective          Wilner Jacobson presents to [facility]  History of Present Illness  Patient presents today to follow-up for hypertension. Blood pressure has been under good control. She is currently taking hydrochlorothiazide 25 mg and lisinopril 40 mg. She takes atorvastatin 40 mg for hyperlipidemia. Her last lipid panel was done back in February which showed the LDL at 109. I plan to repeat her lipid panel when she returns for follow-up. She did have labs done prior to the appointment today. Her CBC showed no anemia with normal white blood cell count and platelets. Her A1c was 6.00% suggesting control of diabetes. She is not currently taking any medication for diabetes.  She does have a slightly elevated alkaline phosphatase level that has trended down from 155 to now being 152. I discussed with patient monitoring and repeating her lab work when she returns in 3 months. The differential would include fatty liver.  She was seen by Dr. Mukherjee today and had a positive anticentromere antibody. He recommended rheumatology consultation. I agree with this assessment. Discussed setting her up with rheumatology. She does have generalized arthritis. She complains of bilateral foot pain for the past couple weeks. She denies any recent injury. I discussed getting further evaluation with an x-ray.  Objective   Vital Signs:   /70   Pulse 70   Temp 98 °F (36.7 °C)   Ht 162.6 cm (64\")   Wt 112 kg (246 lb 8 oz)   SpO2 100%   BMI 42.31 kg/m²     Physical Exam   General: AAO ×3, no acute distress, pleasant  HEENT: Normocephalic, atraumatic  Cardiovascular: Regular rate and rhythm without appreciable murmur  Respiratory: Clear to auscultation bilaterally no RRW  Gastrointestinal: Soft nontender nondistended with bowel sounds present  Musculoskeletal: No palpable foot abnormality appreciated " bilaterally. Her pedal pulses are intact 2+ in both the dorsalis pedis and posterior tibialis.  extremities: No edema  Neurologic: CN II through XII grossly intact   Psychiatric: Normal mood and affect  Result Review :                 Assessment and Plan    Diagnoses and all orders for this visit:    1. Primary hypertension (Primary)  -     CBC & Differential; Future  -     Comprehensive Metabolic Panel; Future    2. Hyperlipidemia, unspecified hyperlipidemia type  -     Comprehensive metabolic panel  -     CBC w AUTO Differential  -     Lipid Panel; Future    3. Hematuria, unspecified type  -     CBC w AUTO Differential    4. Type 2 diabetes mellitus with autonomic neuropathy, unspecified whether long term insulin use (HCC)  -     Hemoglobin A1c  -     CBC & Differential; Future  -     Comprehensive Metabolic Panel; Future  -     Hemoglobin A1c; Future    5. Stage 2 chronic kidney disease  -     Comprehensive Metabolic Panel; Future    6. Centromere antibody positive  -     Ambulatory Referral to Rheumatology    7. Arthritis  -     Ambulatory Referral to Rheumatology    8. Screening for colon cancer  -     Ambulatory Referral For Screening Colonoscopy    9. Elevated alkaline phosphatase level    10. Foot pain, left  -     XR Foot 3+ View Left; Future    11. Foot pain, right  -     XR Foot 3+ View Right; Future    Other orders  -     lisinopril (PRINIVIL,ZESTRIL) 40 MG tablet; Take 1 tablet by mouth Daily.  Dispense: 90 tablet; Refill: 3  -     hydroCHLOROthiazide (HYDRODIURIL) 25 MG tablet; Take 1 tablet by mouth Daily.  Dispense: 90 tablet; Refill: 3    Unclear cause of patient's bilateral foot pain. Pain is mainly on the dorsal aspect of the feet. Discussed getting x-rays for further evaluation.  We discussed continue current management of diabetes with lifestyle changes as her A1c has been under control without medication.  Discussed having her labs repeated when she returns for follow-up in 3 months. I will  make rheumatology consultation for arthritis as well as positive centromere antibody. We discussed continue current antihypertensive management. She does have hematuria that has previously been evaluated by urology. No further work-up at this time per nephrology. I will set up patient for colonoscopy as well as she is due. Order was placed prior to switching to new EMR but order did not convert over.    Follow Up   Return in about 3 months (around 2/4/2022) for diabetes, hypertension.  Patient was given instructions and counseling regarding her condition or for health maintenance advice. Please see specific information pulled into the AVS if appropriate.

## 2021-11-05 ENCOUNTER — HOSPITAL ENCOUNTER (OUTPATIENT)
Dept: GENERAL RADIOLOGY | Facility: HOSPITAL | Age: 66
Discharge: HOME | End: 2021-11-05

## 2021-11-05 DIAGNOSIS — M79.672 FOOT PAIN, LEFT: ICD-10-CM

## 2021-11-05 DIAGNOSIS — M79.671 FOOT PAIN, RIGHT: ICD-10-CM

## 2021-11-05 PROCEDURE — 73630 X-RAY EXAM OF FOOT: CPT

## 2021-12-06 NOTE — TELEPHONE ENCOUNTER
Caller: Wilner Jacobson    Relationship: Self    Best call back number: [phone]     Requested Prescriptions:   Requested Prescriptions     Pending Prescriptions Disp Refills   • lisinopril (PRINIVIL,ZESTRIL) 40 MG tablet 90 tablet 3     Sig: Take 1 tablet by mouth Daily.        Pharmacy where request should be sent: [facility] #[id number] - [place] - [address] [facility] [phone]  - [phone] FX     Additional details provided by patient:     Does the patient have less than a 3 day supply:  [x] Yes  [] No    Janet BURDEN Rep   12/06/21 11:17 EST

## 2021-12-07 RX ORDER — LISINOPRIL 40 MG/1
40 TABLET ORAL DAILY
Qty: 90 TABLET | Refills: 3 | Status: SHIPPED | OUTPATIENT
Start: 2021-12-07 | End: 2022-05-20

## 2022-02-07 ENCOUNTER — OFFICE VISIT (OUTPATIENT)
Dept: FAMILY MEDICINE CLINIC | Facility: CLINIC | Age: 67
End: 2022-02-07

## 2022-02-07 VITALS
OXYGEN SATURATION: 100 % | DIASTOLIC BLOOD PRESSURE: 74 MMHG | HEART RATE: 75 BPM | SYSTOLIC BLOOD PRESSURE: 108 MMHG | HEIGHT: 64 IN | BODY MASS INDEX: 42.03 KG/M2 | WEIGHT: 246.2 LBS | TEMPERATURE: 97.4 F

## 2022-02-07 DIAGNOSIS — M19.072 PRIMARY OSTEOARTHRITIS OF BOTH FEET: ICD-10-CM

## 2022-02-07 DIAGNOSIS — M19.071 PRIMARY OSTEOARTHRITIS OF BOTH FEET: ICD-10-CM

## 2022-02-07 DIAGNOSIS — M79.672 FOOT PAIN, LEFT: ICD-10-CM

## 2022-02-07 DIAGNOSIS — M79.671 RIGHT FOOT PAIN: ICD-10-CM

## 2022-02-07 DIAGNOSIS — E11.43 TYPE 2 DIABETES MELLITUS WITH AUTONOMIC NEUROPATHY, UNSPECIFIED WHETHER LONG TERM INSULIN USE: ICD-10-CM

## 2022-02-07 DIAGNOSIS — I10 PRIMARY HYPERTENSION: Primary | ICD-10-CM

## 2022-02-07 DIAGNOSIS — E78.5 HYPERLIPIDEMIA, UNSPECIFIED HYPERLIPIDEMIA TYPE: ICD-10-CM

## 2022-02-07 DIAGNOSIS — R74.8 ELEVATED ALKALINE PHOSPHATASE LEVEL: ICD-10-CM

## 2022-02-07 DIAGNOSIS — R76.8 CENTROMERE ANTIBODY POSITIVE: ICD-10-CM

## 2022-02-07 PROCEDURE — 99214 OFFICE O/P EST MOD 30 MIN: CPT | Performed by: FAMILY MEDICINE

## 2022-02-07 RX ORDER — ATORVASTATIN CALCIUM 40 MG/1
40 TABLET, FILM COATED ORAL DAILY
Qty: 90 TABLET | Refills: 3 | Status: SHIPPED | OUTPATIENT
Start: 2022-02-07 | End: 2022-03-21 | Stop reason: SDUPTHER

## 2022-02-07 NOTE — PROGRESS NOTES
"Chief Complaint  Left foot pain  Hypertension  hld    Subjective          Wilner Jacobson presents to [facility]  History of Present Illness  Patient presents today to discuss left foot pain again.  We discussed this on a previous visit and ordered x-rays of the left and right foot.  Right has not been as bothersome for her.  She has mild osteoarthritic changes in her left foot and also had arthritic changes in the right foot.  On the left it specifically involved the interphalangeal joints of the digits and the first metatarsophalangeal joint.  At that time we contact the patient and discussed using Voltaren gel.  She has not picked it up for this reason yet.  I discussed with her sending this in again.  She is due for labs.  She is well to have these done prior to the next appointment.  We discussed getting these repeated today.  She is previously being followed for an elevated alkaline phosphatase level.  I discussed having her come back at her earliest convenience to have these labs done.  Blood pressure has been adequately controlled.  She is currently taking lisinopril 40 mg and hydrochlorothiazide 25 mg daily.  She is to see rheumatology given positive anticentromere antibody.  She has appointment coming up in a couple months.  I encouraged her to keep this appointment.  She would also like to discuss her weight as well.  Her BMI is 42.26.  Her weight has been steady from the previous visit.  We discussed lifestyle changes today including diet and exercise.  Her BMI places her in obesity class III category.    Objective   Vital Signs:   /74   Pulse 75   Temp 97.4 °F (36.3 °C)   Ht 162.6 cm (64\")   Wt 112 kg (246 lb 3.2 oz)   SpO2 100%   BMI 42.26 kg/m²     Physical Exam   General: AAO ×3, no acute distress, pleasant  HEENT: Normocephalic, atraumatic  Cardiovascular: Regular rate and rhythm without appreciable murmur  Respiratory: Clear to auscultation bilaterally no " RRW  Gastrointestinal: Soft nontender nondistended with bowel sounds present  extremities: No edema  Neurologic: CN II through XII grossly intact   Psychiatric: Normal mood and affect  Result Review :                 Assessment and Plan    Diagnoses and all orders for this visit:    1. Primary hypertension (Primary)    2. Hyperlipidemia, unspecified hyperlipidemia type    3. Type 2 diabetes mellitus with autonomic neuropathy, unspecified whether long term insulin use (HCC)    4. Centromere antibody positive    5. Elevated alkaline phosphatase level    6. Foot pain, left    7. Right foot pain    8. Primary osteoarthritis of both feet    9. BMI 40.0-44.9, adult (HCC)    Other orders  -     Diclofenac Sodium (VOLTAREN) 1 % gel gel; Apply 4 g topically to the appropriate area as directed 4 (Four) Times a Day As Needed (pain).  Dispense: 100 g; Refill: 2  -     atorvastatin (LIPITOR) 40 MG tablet; Take 1 tablet by mouth Daily.  Dispense: 90 tablet; Refill: 3    Is dueWe discussed continue current management of hypertension hyperlipidemia.  To have her A1c checked as well.  Her last A1c was 6.00%.  Her highest A1c has been 6.5% back on 11/17/2020.  I discussed with her using Voltaren gel as needed for right foot pain.  I discussed with her lifestyle modifications including diet and exercise to help improve her weight/BMI.    Follow Up   Return in about 3 months (around 5/7/2022) for hypertension.  Patient was given instructions and counseling regarding her condition or for health maintenance advice. Please see specific information pulled into the AVS if appropriate.

## 2022-02-11 ENCOUNTER — LAB (OUTPATIENT)
Dept: FAMILY MEDICINE CLINIC | Facility: CLINIC | Age: 67
End: 2022-02-11

## 2022-02-11 DIAGNOSIS — I10 PRIMARY HYPERTENSION: ICD-10-CM

## 2022-02-11 DIAGNOSIS — E78.5 HYPERLIPIDEMIA, UNSPECIFIED HYPERLIPIDEMIA TYPE: ICD-10-CM

## 2022-02-11 DIAGNOSIS — N18.2 STAGE 2 CHRONIC KIDNEY DISEASE: ICD-10-CM

## 2022-02-11 DIAGNOSIS — E11.43 TYPE 2 DIABETES MELLITUS WITH AUTONOMIC NEUROPATHY, UNSPECIFIED WHETHER LONG TERM INSULIN USE: ICD-10-CM

## 2022-02-11 LAB
ALBUMIN SERPL-MCNC: 4.2 G/DL (ref 3.5–5.2)
ALBUMIN/GLOB SERPL: 1.5 G/DL
ALP SERPL-CCNC: 162 U/L (ref 39–117)
ALT SERPL W P-5'-P-CCNC: 13 U/L (ref 1–33)
ANION GAP SERPL CALCULATED.3IONS-SCNC: 6.8 MMOL/L (ref 5–15)
AST SERPL-CCNC: 16 U/L (ref 1–32)
BASOPHILS # BLD AUTO: 0.03 10*3/MM3 (ref 0–0.2)
BASOPHILS NFR BLD AUTO: 0.6 % (ref 0–1.5)
BILIRUB SERPL-MCNC: 0.5 MG/DL (ref 0–1.2)
BUN SERPL-MCNC: 15 MG/DL (ref 8–23)
BUN/CREAT SERPL: 15.8 (ref 7–25)
CALCIUM SPEC-SCNC: 9.6 MG/DL (ref 8.6–10.5)
CHLORIDE SERPL-SCNC: 102 MMOL/L (ref 98–107)
CHOLEST SERPL-MCNC: 180 MG/DL (ref 0–200)
CO2 SERPL-SCNC: 30.2 MMOL/L (ref 22–29)
CREAT SERPL-MCNC: 0.95 MG/DL (ref 0.57–1)
DEPRECATED RDW RBC AUTO: 40.7 FL (ref 37–54)
EOSINOPHIL # BLD AUTO: 0.15 10*3/MM3 (ref 0–0.4)
EOSINOPHIL NFR BLD AUTO: 2.9 % (ref 0.3–6.2)
ERYTHROCYTE [DISTWIDTH] IN BLOOD BY AUTOMATED COUNT: 13.1 % (ref 12.3–15.4)
GFR SERPL CREATININE-BSD FRML MDRD: 71 ML/MIN/1.73
GLOBULIN UR ELPH-MCNC: 2.8 GM/DL
GLUCOSE SERPL-MCNC: 90 MG/DL (ref 65–99)
HBA1C MFR BLD: 5.9 % (ref 4.8–5.6)
HCT VFR BLD AUTO: 38.3 % (ref 34–46.6)
HDLC SERPL-MCNC: 54 MG/DL (ref 40–60)
HGB BLD-MCNC: 12.6 G/DL (ref 12–15.9)
IMM GRANULOCYTES # BLD AUTO: 0.01 10*3/MM3 (ref 0–0.05)
IMM GRANULOCYTES NFR BLD AUTO: 0.2 % (ref 0–0.5)
LDLC SERPL CALC-MCNC: 107 MG/DL (ref 0–100)
LDLC/HDLC SERPL: 1.95 {RATIO}
LYMPHOCYTES # BLD AUTO: 1.82 10*3/MM3 (ref 0.7–3.1)
LYMPHOCYTES NFR BLD AUTO: 35.4 % (ref 19.6–45.3)
MCH RBC QN AUTO: 27.8 PG (ref 26.6–33)
MCHC RBC AUTO-ENTMCNC: 32.9 G/DL (ref 31.5–35.7)
MCV RBC AUTO: 84.5 FL (ref 79–97)
MONOCYTES # BLD AUTO: 0.46 10*3/MM3 (ref 0.1–0.9)
MONOCYTES NFR BLD AUTO: 8.9 % (ref 5–12)
NEUTROPHILS NFR BLD AUTO: 2.67 10*3/MM3 (ref 1.7–7)
NEUTROPHILS NFR BLD AUTO: 52 % (ref 42.7–76)
NRBC BLD AUTO-RTO: 0 /100 WBC (ref 0–0.2)
PLATELET # BLD AUTO: 306 10*3/MM3 (ref 140–450)
PMV BLD AUTO: 11.9 FL (ref 6–12)
POTASSIUM SERPL-SCNC: 3.8 MMOL/L (ref 3.5–5.2)
PROT SERPL-MCNC: 7 G/DL (ref 6–8.5)
RBC # BLD AUTO: 4.53 10*6/MM3 (ref 3.77–5.28)
SODIUM SERPL-SCNC: 139 MMOL/L (ref 136–145)
TRIGL SERPL-MCNC: 103 MG/DL (ref 0–150)
VLDLC SERPL-MCNC: 19 MG/DL (ref 5–40)
WBC NRBC COR # BLD: 5.14 10*3/MM3 (ref 3.4–10.8)

## 2022-02-11 PROCEDURE — 80053 COMPREHEN METABOLIC PANEL: CPT | Performed by: FAMILY MEDICINE

## 2022-02-11 PROCEDURE — 83036 HEMOGLOBIN GLYCOSYLATED A1C: CPT | Performed by: FAMILY MEDICINE

## 2022-02-11 PROCEDURE — 80061 LIPID PANEL: CPT | Performed by: FAMILY MEDICINE

## 2022-02-11 PROCEDURE — 36415 COLL VENOUS BLD VENIPUNCTURE: CPT | Performed by: FAMILY MEDICINE

## 2022-02-11 PROCEDURE — 85025 COMPLETE CBC W/AUTO DIFF WBC: CPT | Performed by: FAMILY MEDICINE

## 2022-03-21 RX ORDER — ATORVASTATIN CALCIUM 40 MG/1
40 TABLET, FILM COATED ORAL DAILY
Qty: 90 TABLET | Refills: 3 | Status: SHIPPED | OUTPATIENT
Start: 2022-03-21 | End: 2022-05-20

## 2022-03-21 NOTE — TELEPHONE ENCOUNTER
Caller: Wilner Jacobson    Relationship: Self    Best call back number: [phone]    Requested Prescriptions:   Requested Prescriptions     Pending Prescriptions Disp Refills   • atorvastatin (LIPITOR) 40 MG tablet 90 tablet 3     Sig: Take 1 tablet by mouth Daily.        Pharmacy where request should be sent: [id number] [place] [address] [facility] [phone] [facility] [phone] FX       Does the patient have less than a 3 day supply:  [x] Yes  [] No    Janet Tao Rep   03/21/22 15:52 EDT

## 2022-05-18 ENCOUNTER — TELEPHONE (OUTPATIENT)
Dept: FAMILY MEDICINE CLINIC | Facility: CLINIC | Age: 67
End: 2022-05-18

## 2022-05-18 NOTE — TELEPHONE ENCOUNTER
Caller: Wilner Jacobson    Relationship: Self    Best call back number: [phone]    What medication are you requesting: AMOXICILLIN    What are your current symptoms: SORE THROAT    How long have you been experiencing symptoms: A WEEK    Have you had these symptoms before:    [x] Yes  [] No    Have you been treated for these symptoms before:   [x] Yes  [] No    If a prescription is needed, what is your preferred pharmacy and phone number: [facility]/PHARMACY #[id number] - [place] - [address]  [phone] [facility] [phone] FX     Additional notes: PATIENT HAS HAD A SORE THROAT FOR A WEEK. THERE IS NO AVAILABILITY FOR AN APPOINTMENT UNTIL 6/1/22. SHE WOULD LIKE TO REQUEST AMOXICILLIN FOR HER SORE THROAT.

## 2022-05-25 ENCOUNTER — PATIENT MESSAGE (OUTPATIENT)
Dept: FAMILY MEDICINE CLINIC | Facility: CLINIC | Age: 67
End: 2022-05-25

## 2022-05-25 DIAGNOSIS — R74.8 ELEVATED ALKALINE PHOSPHATASE LEVEL: ICD-10-CM

## 2022-05-25 DIAGNOSIS — E11.43 TYPE 2 DIABETES MELLITUS WITH AUTONOMIC NEUROPATHY, UNSPECIFIED WHETHER LONG TERM INSULIN USE: ICD-10-CM

## 2022-05-25 DIAGNOSIS — E78.00 PURE HYPERCHOLESTEROLEMIA: Primary | ICD-10-CM

## 2022-05-25 DIAGNOSIS — Z51.81 MEDICATION MONITORING ENCOUNTER: ICD-10-CM

## 2022-05-26 NOTE — TELEPHONE ENCOUNTER
From: Wilner Jacobson  To: Abrahan Rick DO  Sent: 5/25/2022 9:49 PM EDT  Subject: Blood work    [facility] Dr. Rick  Will I need blood work done before my appointment on June 1?  Thank you  Wilner Jacobson

## 2022-06-01 ENCOUNTER — OFFICE VISIT (OUTPATIENT)
Dept: FAMILY MEDICINE CLINIC | Facility: CLINIC | Age: 67
End: 2022-06-01

## 2022-06-01 VITALS
HEIGHT: 64 IN | HEART RATE: 88 BPM | WEIGHT: 250.2 LBS | DIASTOLIC BLOOD PRESSURE: 88 MMHG | SYSTOLIC BLOOD PRESSURE: 122 MMHG | TEMPERATURE: 98.2 F | OXYGEN SATURATION: 97 % | BODY MASS INDEX: 42.72 KG/M2

## 2022-06-01 DIAGNOSIS — E11.65 TYPE 2 DIABETES MELLITUS WITH HYPERGLYCEMIA, WITHOUT LONG-TERM CURRENT USE OF INSULIN: ICD-10-CM

## 2022-06-01 DIAGNOSIS — Z51.81 MEDICATION MONITORING ENCOUNTER: ICD-10-CM

## 2022-06-01 DIAGNOSIS — R06.2 WHEEZING: ICD-10-CM

## 2022-06-01 DIAGNOSIS — E11.43 TYPE 2 DIABETES MELLITUS WITH AUTONOMIC NEUROPATHY, UNSPECIFIED WHETHER LONG TERM INSULIN USE: ICD-10-CM

## 2022-06-01 DIAGNOSIS — R74.8 ELEVATED ALKALINE PHOSPHATASE LEVEL: ICD-10-CM

## 2022-06-01 DIAGNOSIS — I10 PRIMARY HYPERTENSION: Primary | ICD-10-CM

## 2022-06-01 DIAGNOSIS — R76.8 CENTROMERE ANTIBODY POSITIVE: ICD-10-CM

## 2022-06-01 DIAGNOSIS — E78.00 PURE HYPERCHOLESTEROLEMIA: ICD-10-CM

## 2022-06-01 DIAGNOSIS — Z12.11 SCREENING FOR COLON CANCER: ICD-10-CM

## 2022-06-01 LAB
ALBUMIN SERPL-MCNC: 4.1 G/DL (ref 3.5–5.2)
ALBUMIN/GLOB SERPL: 1.4 G/DL
ALP SERPL-CCNC: 155 U/L (ref 39–117)
ALT SERPL W P-5'-P-CCNC: 18 U/L (ref 1–33)
ANION GAP SERPL CALCULATED.3IONS-SCNC: 9.6 MMOL/L (ref 5–15)
AST SERPL-CCNC: 19 U/L (ref 1–32)
BASOPHILS # BLD AUTO: 0.03 10*3/MM3 (ref 0–0.2)
BASOPHILS NFR BLD AUTO: 0.6 % (ref 0–1.5)
BILIRUB SERPL-MCNC: 0.4 MG/DL (ref 0–1.2)
BUN SERPL-MCNC: 14 MG/DL (ref 8–23)
BUN/CREAT SERPL: 14.4 (ref 7–25)
CALCIUM SPEC-SCNC: 9.4 MG/DL (ref 8.6–10.5)
CHLORIDE SERPL-SCNC: 102 MMOL/L (ref 98–107)
CHOLEST SERPL-MCNC: 169 MG/DL (ref 0–200)
CO2 SERPL-SCNC: 25.4 MMOL/L (ref 22–29)
CREAT SERPL-MCNC: 0.97 MG/DL (ref 0.57–1)
DEPRECATED RDW RBC AUTO: 40.2 FL (ref 37–54)
EGFRCR SERPLBLD CKD-EPI 2021: 64.6 ML/MIN/1.73
EOSINOPHIL # BLD AUTO: 0.2 10*3/MM3 (ref 0–0.4)
EOSINOPHIL NFR BLD AUTO: 4.2 % (ref 0.3–6.2)
ERYTHROCYTE [DISTWIDTH] IN BLOOD BY AUTOMATED COUNT: 13.1 % (ref 12.3–15.4)
GGT SERPL-CCNC: 21 U/L (ref 5–36)
GLOBULIN UR ELPH-MCNC: 2.9 GM/DL
GLUCOSE SERPL-MCNC: 103 MG/DL (ref 65–99)
HBA1C MFR BLD: 6 % (ref 4.8–5.6)
HCT VFR BLD AUTO: 37.9 % (ref 34–46.6)
HDLC SERPL-MCNC: 49 MG/DL (ref 40–60)
HGB BLD-MCNC: 12.3 G/DL (ref 12–15.9)
IMM GRANULOCYTES # BLD AUTO: 0.01 10*3/MM3 (ref 0–0.05)
IMM GRANULOCYTES NFR BLD AUTO: 0.2 % (ref 0–0.5)
LDLC SERPL CALC-MCNC: 101 MG/DL (ref 0–100)
LDLC/HDLC SERPL: 2.01 {RATIO}
LYMPHOCYTES # BLD AUTO: 1.64 10*3/MM3 (ref 0.7–3.1)
LYMPHOCYTES NFR BLD AUTO: 34.7 % (ref 19.6–45.3)
MCH RBC QN AUTO: 28 PG (ref 26.6–33)
MCHC RBC AUTO-ENTMCNC: 32.5 G/DL (ref 31.5–35.7)
MCV RBC AUTO: 86.1 FL (ref 79–97)
MONOCYTES # BLD AUTO: 0.4 10*3/MM3 (ref 0.1–0.9)
MONOCYTES NFR BLD AUTO: 8.5 % (ref 5–12)
NEUTROPHILS NFR BLD AUTO: 2.45 10*3/MM3 (ref 1.7–7)
NEUTROPHILS NFR BLD AUTO: 51.8 % (ref 42.7–76)
NRBC BLD AUTO-RTO: 0 /100 WBC (ref 0–0.2)
PLATELET # BLD AUTO: 310 10*3/MM3 (ref 140–450)
PMV BLD AUTO: 11.6 FL (ref 6–12)
POTASSIUM SERPL-SCNC: 4.2 MMOL/L (ref 3.5–5.2)
PROT SERPL-MCNC: 7 G/DL (ref 6–8.5)
RBC # BLD AUTO: 4.4 10*6/MM3 (ref 3.77–5.28)
SODIUM SERPL-SCNC: 137 MMOL/L (ref 136–145)
TRIGL SERPL-MCNC: 107 MG/DL (ref 0–150)
VLDLC SERPL-MCNC: 19 MG/DL (ref 5–40)
WBC NRBC COR # BLD: 4.73 10*3/MM3 (ref 3.4–10.8)

## 2022-06-01 PROCEDURE — 36415 COLL VENOUS BLD VENIPUNCTURE: CPT | Performed by: FAMILY MEDICINE

## 2022-06-01 PROCEDURE — 82977 ASSAY OF GGT: CPT | Performed by: FAMILY MEDICINE

## 2022-06-01 PROCEDURE — 80061 LIPID PANEL: CPT | Performed by: FAMILY MEDICINE

## 2022-06-01 PROCEDURE — 80053 COMPREHEN METABOLIC PANEL: CPT | Performed by: FAMILY MEDICINE

## 2022-06-01 PROCEDURE — 85025 COMPLETE CBC W/AUTO DIFF WBC: CPT | Performed by: FAMILY MEDICINE

## 2022-06-01 PROCEDURE — 99214 OFFICE O/P EST MOD 30 MIN: CPT | Performed by: FAMILY MEDICINE

## 2022-06-01 PROCEDURE — 83036 HEMOGLOBIN GLYCOSYLATED A1C: CPT | Performed by: FAMILY MEDICINE

## 2022-06-01 RX ORDER — ATORVASTATIN CALCIUM 40 MG/1
40 TABLET, FILM COATED ORAL DAILY
Qty: 90 TABLET | Refills: 3 | Status: SHIPPED | OUTPATIENT
Start: 2022-06-01 | End: 2022-06-30 | Stop reason: SDUPTHER

## 2022-06-01 RX ORDER — HYDROCHLOROTHIAZIDE 25 MG/1
25 TABLET ORAL EVERY 24 HOURS
Qty: 90 TABLET | Refills: 3 | Status: SHIPPED | OUTPATIENT
Start: 2022-06-01 | End: 2023-01-10 | Stop reason: SDUPTHER

## 2022-06-01 RX ORDER — LISINOPRIL 40 MG/1
40 TABLET ORAL EVERY 24 HOURS
Qty: 90 TABLET | Refills: 3 | Status: SHIPPED | OUTPATIENT
Start: 2022-06-01 | End: 2022-07-19 | Stop reason: SDUPTHER

## 2022-06-01 NOTE — PROGRESS NOTES
"Chief Complaint  Hypertension  Wheezing    Subjective        Wilner Jacobson presents to [facility]  History of Present Illness  Patient presents today to follow-up for hypertension.  Blood pressure has been adequately controlled.  She is currently taking hydrochlorothiazide 25 mg daily and lisinopril 40 mg daily.  She was recently seen in urgent care for otitis media and was treated with cefdinir.  She reports that her symptoms have resolved except for some wheezing in her chest that she still notices only when she lays down at nighttime.  She denies any shortness of breath with this.  She is afebrile.  She is continuing to take atorvastatin 40 mg daily for hyperlipidemia.  She is due for labs.  She was previously noted to have an elevated alkaline phosphatase level.  Plan to order a GGT level today and repeat her alkaline phosphatase level.  Her A1c most recently in February was 5.9%.  Has been as high as 6.5%.  She is not currently on any medications for diabetes.  We discussed lifestyle changes including diet and exercise.  Objective   Vital Signs:  /88   Pulse 88   Temp 98.2 °F (36.8 °C)   Ht 162.6 cm (64\")   Wt 113 kg (250 lb 3.2 oz)   SpO2 97%   BMI 42.95 kg/m²           Physical Exam    General: AAO ×3, no acute distress, pleasant  HEENT: Normocephalic, atraumatic  Cardiovascular: Regular rate and rhythm without appreciable murmur  Respiratory: Clear to auscultation bilaterally no RRW  Gastrointestinal: Soft nontender nondistended with bowel sounds present  extremities: No edema  Neurologic: CN II through XII grossly intact   Psychiatric: Normal mood and affect  Result Review :                Assessment and Plan   Diagnoses and all orders for this visit:    1. Primary hypertension (Primary)    2. Pure hypercholesterolemia  -     Lipid Panel    3. Type 2 diabetes mellitus with hyperglycemia, without long-term current use of insulin (HCC)    4. Elevated alkaline " phosphatase level  -     Comprehensive Metabolic Panel  -     Gamma GT    5. Centromere antibody positive    6. Screening for colon cancer  -     Ambulatory Referral to General Surgery    7. Wheezing  -     XR Chest PA & Lateral; Future    8. Medication monitoring encounter  -     Lipid Panel  -     Hemoglobin A1c  -     Comprehensive Metabolic Panel  -     CBC & Differential    9. Type 2 diabetes mellitus with autonomic neuropathy, unspecified whether long term insulin use (HCC)  -     Hemoglobin A1c  -     Comprehensive Metabolic Panel  -     CBC & Differential    Other orders  -     hydroCHLOROthiazide (HYDRODIURIL) 25 MG tablet; Take 1 tablet by mouth Daily.  Dispense: 90 tablet; Refill: 3  -     atorvastatin (LIPITOR) 40 MG tablet; Take 1 tablet by mouth Daily.  Dispense: 90 tablet; Refill: 3  -     lisinopril (PRINIVIL,ZESTRIL) 40 MG tablet; Take 1 tablet by mouth Daily.  Dispense: 90 tablet; Refill: 3    No wheezing noted on exam.  At denies any history of asthma.  Reports previously being checked for tuberculosis and was positive and had treatment for this.  She reports that she was previously getting a routine chest x-ray.  I discussed getting a follow-up chest x-ray especially given her concerns about wheezing at nighttime.  Her lung exam is normal today.  We discussed continue current management of hypercholesterolemia and hypertension.  Plan to see patient back in 3 months or sooner if needed.  Patient instructed to call with any questions or concerns.         Follow Up   Return in about 3 months (around 9/1/2022) for hypertension.  Patient was given instructions and counseling regarding her condition or for health maintenance advice. Please see specific information pulled into the AVS if appropriate.

## 2022-06-02 ENCOUNTER — HOSPITAL ENCOUNTER (OUTPATIENT)
Dept: GENERAL RADIOLOGY | Facility: HOSPITAL | Age: 67
Discharge: HOME | End: 2022-06-02
Admitting: FAMILY MEDICINE

## 2022-06-02 ENCOUNTER — PATIENT MESSAGE (OUTPATIENT)
Dept: FAMILY MEDICINE CLINIC | Facility: CLINIC | Age: 67
End: 2022-06-02

## 2022-06-02 DIAGNOSIS — R06.2 WHEEZING: ICD-10-CM

## 2022-06-02 DIAGNOSIS — R74.8 ELEVATED ALKALINE PHOSPHATASE LEVEL: Primary | ICD-10-CM

## 2022-06-02 PROCEDURE — 71046 X-RAY EXAM CHEST 2 VIEWS: CPT

## 2022-06-13 ENCOUNTER — OFFICE VISIT (OUTPATIENT)
Dept: ORTHOPEDIC SURGERY | Facility: CLINIC | Age: 67
End: 2022-06-13

## 2022-06-13 VITALS — BODY MASS INDEX: 43.09 KG/M2 | WEIGHT: 252.4 LBS | HEIGHT: 64 IN | OXYGEN SATURATION: 98 % | HEART RATE: 92 BPM

## 2022-06-13 DIAGNOSIS — Z96.652 HISTORY OF TOTAL KNEE ARTHROPLASTY, LEFT: Primary | ICD-10-CM

## 2022-06-13 DIAGNOSIS — M76.52 PATELLAR TENDINITIS OF LEFT KNEE: ICD-10-CM

## 2022-06-13 PROCEDURE — 99213 OFFICE O/P EST LOW 20 MIN: CPT | Performed by: ORTHOPAEDIC SURGERY

## 2022-06-13 NOTE — PROGRESS NOTES
"Chief Complaint  Follow-up of the Left Knee     Subjective      Wilner Jacobson presents to [facility] for a follow-up of left knee. Patient is s/p left TKA 12/1/20 by Dr. Torres. She reports her pain is better than it was pre-operatively. She reports pain about the anterior aspect of her knee, she denies any joint line pain. She gets occasional swelling in the knee. She denies any injuries or trauma, fevers and chills today.     No Known Allergies     Social History     Socioeconomic History   • Marital status:    Tobacco Use   • Smoking status: Never Smoker   • Smokeless tobacco: Never Used   Substance and Sexual Activity   • Alcohol use: Yes     Alcohol/week: 2.0 standard drinks     Types: 2 Glasses of wine per week     Comment: occasionally drinks, has been drinking for 31 or more years    • Drug use: Never   • Sexual activity: Yes     Partners: Male     Birth control/protection: None        Review of Systems     Objective   Vital Signs:   Pulse 92   Ht 162.6 cm (64\")   Wt 114 kg (252 lb 6.4 oz)   SpO2 98%   BMI 43.32 kg/m²       Physical Exam  Constitutional:       Appearance: Normal appearance. Patient is well-developed and normal weight.   HENT:      Head: Normocephalic.      Right Ear: Hearing and external ear normal.      Left Ear: Hearing and external ear normal.      Nose: Nose normal.   Eyes:      Conjunctiva/sclera: Conjunctivae normal.   Cardiovascular:      Rate and Rhythm: Normal rate.   Pulmonary:      Effort: Pulmonary effort is normal.      Breath sounds: No wheezing or rales.   Abdominal:      Palpations: Abdomen is soft.      Tenderness: There is no abdominal tenderness.   Musculoskeletal:      Cervical back: Normal range of motion.   Skin:     Findings: No rash.   Neurological:      Mental Status: Patient  is alert and oriented to person, place, and time.   Psychiatric:         Mood and Affect: Mood and affect normal.         Judgment: Judgment normal. "       Ortho Exam      LEFT KNEE: Scars well healed. Tender anterior knee. Full extension. Flexion to 120 degrees. Non-antalgic gait. Calf soft. Good strength to hamstrings, quadriceps, dorsiflexors and plantar flexors. Stable to varus/valgus stress. Non-tender joint lines.       Procedures        Imaging Results (Most Recent)     Procedure Component Value Units Date/Time    XR Knee 3 View Left [[id number]] Resulted: 06/13/22 0858     Updated: 06/13/22 0906           Result Review :     X-Ray Report:  Left knee(s) X-Ray  Indication: Evaluation of left knee pain   AP, Lateral and Standing view(s)  Findings: No acute fractures or dislocation. Intact left total knee replacement, no evidence of wearing or loosening.   Prior studies available for comparison: no     Assessment and Plan     Diagnoses and all orders for this visit:    1. History of total knee arthroplasty, left (Primary)  -     XR Knee 3 View Left    2. Patellar tendinitis of left knee        Patient will do therapy exercises at home, if she wishes she may call back to set up PT if needed. Voltaren Gel prescribed.     Follow-up in 2 years for recheck, follow-up sooner if pain worsens.     Call or return if worsening symptoms.    Follow Up     PRN.       Patient was given instructions and counseling regarding her condition or for health maintenance advice. Please see specific information pulled into the AVS if appropriate.     Scribed for Dada Torres MD by Kimmy Cote.  06/13/22   08:47 EDT    I have personally performed the services described in this document as scribed by the above individual and it is both accurate and complete. Dada Torres MD 06/13/22

## 2022-06-28 ENCOUNTER — APPOINTMENT (OUTPATIENT)
Dept: NUCLEAR MEDICINE | Facility: HOSPITAL | Age: 67
End: 2022-06-28

## 2022-06-28 ENCOUNTER — HOSPITAL ENCOUNTER (OUTPATIENT)
Dept: NUCLEAR MEDICINE | Facility: HOSPITAL | Age: 67
Discharge: HOME | End: 2022-06-28

## 2022-06-28 DIAGNOSIS — R74.8 ELEVATED ALKALINE PHOSPHATASE LEVEL: ICD-10-CM

## 2022-06-28 PROCEDURE — 78306 BONE IMAGING WHOLE BODY: CPT

## 2022-06-28 PROCEDURE — 0 TECHNETIUM MEDRONATE KIT: Performed by: FAMILY MEDICINE

## 2022-06-28 PROCEDURE — A9503 TC99M MEDRONATE: HCPCS | Performed by: FAMILY MEDICINE

## 2022-06-28 RX ORDER — TC 99M MEDRONATE 20 MG/10ML
20.9 INJECTION, POWDER, LYOPHILIZED, FOR SOLUTION INTRAVENOUS
Status: COMPLETED | OUTPATIENT
Start: 2022-06-28 | End: 2022-06-28

## 2022-06-28 RX ADMIN — TC 99M MEDRONATE 20.9 MILLICURIE: 20 INJECTION, POWDER, LYOPHILIZED, FOR SOLUTION INTRAVENOUS at 09:53

## 2022-06-30 ENCOUNTER — PREP FOR SURGERY (OUTPATIENT)
Dept: OTHER | Facility: HOSPITAL | Age: 67
End: 2022-06-30

## 2022-06-30 ENCOUNTER — OFFICE VISIT (OUTPATIENT)
Dept: SURGERY | Facility: CLINIC | Age: 67
End: 2022-06-30

## 2022-06-30 VITALS — HEART RATE: 97 BPM | WEIGHT: 250 LBS | RESPIRATION RATE: 18 BRPM | HEIGHT: 64 IN | BODY MASS INDEX: 42.68 KG/M2

## 2022-06-30 DIAGNOSIS — Z12.11 SCREENING FOR MALIGNANT NEOPLASM OF COLON: Primary | ICD-10-CM

## 2022-06-30 PROCEDURE — S0260 H&P FOR SURGERY: HCPCS | Performed by: NURSE PRACTITIONER

## 2022-06-30 RX ORDER — POLYETHYLENE GLYCOL 3350 17 G/17G
POWDER, FOR SOLUTION ORAL
Qty: 238 PACKET | Refills: 0 | Status: SHIPPED | OUTPATIENT
Start: 2022-06-30 | End: 2022-07-19 | Stop reason: SDUPTHER

## 2022-06-30 RX ORDER — SODIUM CHLORIDE 0.9 % (FLUSH) 0.9 %
10 SYRINGE (ML) INJECTION AS NEEDED
Status: CANCELLED | OUTPATIENT
Start: 2022-06-30

## 2022-06-30 RX ORDER — ATORVASTATIN CALCIUM 40 MG/1
40 TABLET, FILM COATED ORAL DAILY
Qty: 90 TABLET | Refills: 3 | Status: SHIPPED | OUTPATIENT
Start: 2022-06-30 | End: 2022-07-19 | Stop reason: SDUPTHER

## 2022-06-30 RX ORDER — AMOXICILLIN 500 MG/1
CAPSULE ORAL
COMMUNITY
Start: 2022-06-20 | End: 2022-10-15

## 2022-06-30 RX ORDER — SODIUM CHLORIDE 0.9 % (FLUSH) 0.9 %
3 SYRINGE (ML) INJECTION EVERY 12 HOURS SCHEDULED
Status: CANCELLED | OUTPATIENT
Start: 2022-06-30

## 2022-06-30 NOTE — PROGRESS NOTES
Chief Complaint: Colonoscopy (consult)    Subjective      Colonoscopy consultation        History of Present Illness  Wilner Jacobson is a 66 y.o. female presents to [facility] for colonoscopy consultation.    Patient presents today on referral from Dr. Rasheed Mhaajan.    Patient presents today for screening colonoscopy.  She denies any abdominal pain, change in bowel habit, or rectal bleeding.    Patient denies any family history of colorectal cancer.    5/11: colonoscopy (EDUARDORadha Keyshawn): Normal colon.  Objective     Past Medical History:   Diagnosis Date   • Arthritis    • Bladder disorder    • Diabetes (HCC)    • Eating disorder    • Hematuria    • High cholesterol    • HL (hearing loss) 2020   • Hyperlipemia    • Hypertension    • Obesity    • Renal cyst     right renal cyst   • Visual impairment        Past Surgical History:   Procedure Laterality Date   • CYSTOSCOPY      2/27/13-office flexible cystoscopy w/ Dr. Bledsoe 3/14/13 Cysto, biopsy, flug w/ Dr. Bledsoe   • KNEE SURGERY Left 12/2020    knee replcement       Outpatient Medications Marked as Taking for the 6/30/22 encounter (Office Visit) with Alcira Arroyo APRN   Medication Sig Dispense Refill   • amoxicillin (AMOXIL) 500 MG capsule TAKE 4 CAPSULES BY MOUTH ONE HOUR BEFORE DENTAL PROCEDURE     • atorvastatin (LIPITOR) 40 MG tablet Take 1 tablet by mouth Daily. 90 tablet 3   • Diclofenac Sodium (VOLTAREN) 1 % gel gel Apply 4 g topically to the appropriate area as directed 4 (Four) Times a Day As Needed (AS NEEDED). 100 g 1   • hydroCHLOROthiazide (HYDRODIURIL) 25 MG tablet Take 1 tablet by mouth Daily. 90 tablet 3   • lisinopril (PRINIVIL,ZESTRIL) 40 MG tablet Take 1 tablet by mouth Daily. 90 tablet 3       No Known Allergies     Family History   Problem Relation Age of Onset   • Arthritis Mother    • Diabetes Son         He no longer has it       Social History     Socioeconomic History   • Marital status:    Tobacco  "Use   • Smoking status: Never Smoker   • Smokeless tobacco: Never Used   Vaping Use   • Vaping Use: Never used   Substance and Sexual Activity   • Alcohol use: Yes     Alcohol/week: 2.0 standard drinks     Types: 2 Glasses of wine per week     Comment: occasionally drinks, has been drinking for 31 or more years    • Drug use: Never   • Sexual activity: Yes     Partners: Male     Birth control/protection: None       Review of Systems   Constitutional: Negative for chills and fever.   Gastrointestinal: Negative for abdominal distention, abdominal pain, anal bleeding, blood in stool, constipation, diarrhea and rectal pain.        Vital Signs:   Pulse 97   Resp 18   Ht 162.6 cm (64\")   Wt 113 kg (250 lb)   BMI 42.91 kg/m²      Physical Exam  Constitutional:       Appearance: She is obese.   HENT:      Head: Normocephalic.   Cardiovascular:      Rate and Rhythm: Normal rate.   Pulmonary:      Effort: Pulmonary effort is normal.   Abdominal:      Palpations: Abdomen is soft.   Skin:     General: Skin is warm and dry.   Neurological:      General: No focal deficit present.      Mental Status: She is alert and oriented to person, place, and time.   Psychiatric:         Mood and Affect: Mood normal.         Thought Content: Thought content normal.          Result Review :          []  Laboratory  []  Radiology  []  Pathology  []  Microbiology  []  EKG/Telemetry   []  Cardiology/Vascular   [x]  Old records  I have reviewed Rasheed Mahajan's previous colonoscopy.     Assessment and Plan    Diagnoses and all orders for this visit:    1. Screening for malignant neoplasm of colon (Primary)    Other orders  -     polyethylene glycol (MIRALAX) 17 g packet; Take as directed.  Instructions given in office.  Dispense: 238 g bottle  Dispense: 238 packet; Refill: 0    white prep    Follow Up   Return for Schedule colonoscopy with Dr. Stanley on 8/30/2022 at [facility].     Hospital arrival time: 10:00    Possible " risks/complications, benefits, and alternatives to surgical or invasive procedure have been explained to patient and/or legal guardian.     Patient has been evaluated and can tolerate anesthesia and/or sedation. Risks, benefits, and alternatives to anesthesia and sedation have been explained to patient and/or legal guardian.  Patient verbalizes understanding is will proceed with above plan.    Patient was given instructions and counseling regarding her condition or for health maintenance advice. Please see specific information pulled into the AVS if appropriate.     * EMR dragon/transcription disclaimer: Much of this encounter note is an electronic transcription/translation of spoken language to printed text. Electronic translation of spoken language may permit erroneous, or at times nonsensical words or phrases to be inadvertently transcribed; although I have reviewed the note for such errors, some may still exist.

## 2022-07-01 ENCOUNTER — TELEPHONE (OUTPATIENT)
Dept: FAMILY MEDICINE CLINIC | Facility: CLINIC | Age: 67
End: 2022-07-01

## 2022-07-01 NOTE — TELEPHONE ENCOUNTER
Patient called wanting to know why she was unable to  her prescription of atorvastatin. Contacted pharmacy and was informed that pt picked up a 90 day supply on 6/1/2022 and would be unable to  another refill until 8/8/2022. Patient notified

## 2022-07-05 ENCOUNTER — APPOINTMENT (OUTPATIENT)
Dept: NUCLEAR MEDICINE | Facility: HOSPITAL | Age: 67
End: 2022-07-05

## 2022-07-19 ENCOUNTER — OFFICE VISIT (OUTPATIENT)
Dept: FAMILY MEDICINE CLINIC | Facility: CLINIC | Age: 67
End: 2022-07-19

## 2022-07-19 VITALS
BODY MASS INDEX: 41.54 KG/M2 | HEIGHT: 64 IN | WEIGHT: 243.3 LBS | DIASTOLIC BLOOD PRESSURE: 82 MMHG | TEMPERATURE: 98.4 F | OXYGEN SATURATION: 100 % | HEART RATE: 100 BPM | SYSTOLIC BLOOD PRESSURE: 132 MMHG

## 2022-07-19 DIAGNOSIS — E11.65 TYPE 2 DIABETES MELLITUS WITH HYPERGLYCEMIA, WITHOUT LONG-TERM CURRENT USE OF INSULIN: ICD-10-CM

## 2022-07-19 DIAGNOSIS — R74.8 ELEVATED ALKALINE PHOSPHATASE LEVEL: ICD-10-CM

## 2022-07-19 DIAGNOSIS — I10 PRIMARY HYPERTENSION: Primary | ICD-10-CM

## 2022-07-19 DIAGNOSIS — Z51.81 MEDICATION MONITORING ENCOUNTER: ICD-10-CM

## 2022-07-19 DIAGNOSIS — E78.00 PURE HYPERCHOLESTEROLEMIA: ICD-10-CM

## 2022-07-19 DIAGNOSIS — M51.36 DDD (DEGENERATIVE DISC DISEASE), LUMBAR: ICD-10-CM

## 2022-07-19 DIAGNOSIS — K59.00 CONSTIPATION, UNSPECIFIED CONSTIPATION TYPE: ICD-10-CM

## 2022-07-19 PROCEDURE — 99214 OFFICE O/P EST MOD 30 MIN: CPT | Performed by: FAMILY MEDICINE

## 2022-07-19 RX ORDER — TIZANIDINE 4 MG/1
4 TABLET ORAL NIGHTLY PRN
COMMUNITY
End: 2022-08-30 | Stop reason: HOSPADM

## 2022-07-19 RX ORDER — POLYETHYLENE GLYCOL 3350 17 G/17G
POWDER, FOR SOLUTION ORAL
Qty: 72 EACH | Refills: 0 | Status: ON HOLD | OUTPATIENT
Start: 2022-07-19 | End: 2022-08-30

## 2022-07-19 RX ORDER — LISINOPRIL 40 MG/1
40 TABLET ORAL EVERY 24 HOURS
Qty: 90 TABLET | Refills: 3 | Status: SHIPPED | OUTPATIENT
Start: 2022-07-19 | End: 2023-01-10 | Stop reason: SDUPTHER

## 2022-07-19 RX ORDER — ATORVASTATIN CALCIUM 40 MG/1
40 TABLET, FILM COATED ORAL DAILY
Qty: 90 TABLET | Refills: 3 | Status: SHIPPED | OUTPATIENT
Start: 2022-07-19 | End: 2023-01-10 | Stop reason: SDUPTHER

## 2022-07-19 NOTE — PROGRESS NOTES
"Chief Complaint  Hypertension  Discuss labs    Subjective        Wilner Jacobson presents to [facility]  History of Present Illness  Patient presents today to follow-up for hypertension and hyperlipidemia.  She had labs done back on 6/1/2022.  This showed that her GGT level was within normal limits.  Her CMP showed that her alkaline phosphatase level was stable but slightly elevated at 155.  I did order a bone scan for further evaluation.  This revealed per report that she had multiple areas of abnormal uptake within the spine, shoulders, knees and feet and ankles compatible with degenerative changes.  She does have issues with her low back.  She does take tizanidine which helps out however she cannot stand for long periods of time due to having worsening low back pain when standing.  X-ray previously revealed per report on 2/24/2021 mid to lower lumbar spine.  She denies any radiation of pain down her legs.  I reviewed her lipid panel.  LDL is 101.  She is currently taking atorvastatin 40 mg daily.  Blood pressure has been adequately controlled taking lisinopril 40 mg daily and hydrochlorothiazide 25 mg daily.  Objective   Vital Signs:  /82   Pulse 100   Temp 98.4 °F (36.9 °C)   Ht 162.6 cm (64\")   Wt 110 kg (243 lb 4.8 oz)   SpO2 100%   BMI 41.76 kg/m²   Estimated body mass index is 41.76 kg/m² as calculated from the following:    Height as of this encounter: 162.6 cm (64\").    Weight as of this encounter: 110 kg (243 lb 4.8 oz).          Physical Exam   General: AAO ×3, no acute distress, pleasant  HEENT: Normocephalic, atraumatic  Cardiovascular: Regular rate and rhythm without appreciable murmur  Respiratory: Clear to auscultation bilaterally no RRW  Gastrointestinal: Soft nontender nondistended with bowel sounds present  extremities: No edema  Musculoskeletal: Paraspinal hypertonicity lumbar spine.  Nontender to palpation.  Neurologic: CN II through XII grossly " intact   Psychiatric: Normal mood and affect  Result Review :                Assessment and Plan   Diagnoses and all orders for this visit:    1. Primary hypertension (Primary)  -     CBC & Differential; Future  -     Comprehensive Metabolic Panel; Future    2. Medication monitoring encounter  -     CBC & Differential; Future  -     Comprehensive Metabolic Panel; Future  -     Hemoglobin A1c; Future  -     Lipid Panel; Future    3. Constipation, unspecified constipation type    4. Type 2 diabetes mellitus with hyperglycemia, without long-term current use of insulin (HCC)  -     Hemoglobin A1c; Future    5. Pure hypercholesterolemia  -     Lipid Panel; Future    6. DDD (degenerative disc disease), lumbar    7. Elevated alkaline phosphatase level  -     Comprehensive Metabolic Panel; Future  -     Gamma GT; Future    Other orders  -     atorvastatin (LIPITOR) 40 MG tablet; Take 1 tablet by mouth Daily.  Dispense: 90 tablet; Refill: 3  -     polyethylene glycol (MIRALAX) 17 g packet; Take 1 packet and mix with water once daily as needed for constipation  Dispense: 72 each; Refill: 0  -     lisinopril (PRINIVIL,ZESTRIL) 40 MG tablet; Take 1 tablet by mouth Daily.  Dispense: 90 tablet; Refill: 3    I discussed with patient that she does have degenerative changes noted on her bone scan.  No concerning lesion.  Plan to monitor her alkaline phosphatase level at this time.  I do plan on repeating GGT level as well.  Plan to see her back in 6 months.  Medications have been refilled today.  As far as her low back is concerned we discussed stretching exercises and continue tizanidine on an as-needed basis.         Follow Up   Return in about 6 months (around 1/19/2023) for diabetes.  Patient was given instructions and counseling regarding her condition or for health maintenance advice. Please see specific information pulled into the AVS if appropriate.

## 2022-08-29 ENCOUNTER — TRANSCRIBE ORDERS (OUTPATIENT)
Dept: OBSTETRICS AND GYNECOLOGY | Facility: CLINIC | Age: 67
End: 2022-08-29

## 2022-08-29 DIAGNOSIS — Z12.31 SCREENING MAMMOGRAM FOR BREAST CANCER: Primary | ICD-10-CM

## 2022-08-30 ENCOUNTER — TELEPHONE (OUTPATIENT)
Dept: SURGERY | Facility: CLINIC | Age: 67
End: 2022-08-30

## 2022-08-30 ENCOUNTER — HOSPITAL ENCOUNTER (OUTPATIENT)
Facility: HOSPITAL | Age: 67
Setting detail: HOSPITAL OUTPATIENT SURGERY
Discharge: HOME | End: 2022-08-30
Attending: SURGERY | Admitting: SURGERY

## 2022-08-30 ENCOUNTER — ANESTHESIA EVENT (OUTPATIENT)
Dept: GASTROENTEROLOGY | Facility: HOSPITAL | Age: 67
End: 2022-08-30

## 2022-08-30 ENCOUNTER — ANESTHESIA (OUTPATIENT)
Dept: GASTROENTEROLOGY | Facility: HOSPITAL | Age: 67
End: 2022-08-30

## 2022-08-30 VITALS
RESPIRATION RATE: 18 BRPM | OXYGEN SATURATION: 100 % | BODY MASS INDEX: 41.25 KG/M2 | TEMPERATURE: 98 F | WEIGHT: 241.62 LBS | HEART RATE: 64 BPM | DIASTOLIC BLOOD PRESSURE: 77 MMHG | SYSTOLIC BLOOD PRESSURE: 130 MMHG | HEIGHT: 64 IN

## 2022-08-30 DIAGNOSIS — Z12.11 SCREENING FOR MALIGNANT NEOPLASM OF COLON: ICD-10-CM

## 2022-08-30 PROCEDURE — 88305 TISSUE EXAM BY PATHOLOGIST: CPT | Performed by: SURGERY

## 2022-08-30 PROCEDURE — 25010000002 PROPOFOL 10 MG/ML EMULSION: Performed by: NURSE ANESTHETIST, CERTIFIED REGISTERED

## 2022-08-30 RX ORDER — SODIUM CHLORIDE, SODIUM LACTATE, POTASSIUM CHLORIDE, CALCIUM CHLORIDE 600; 310; 30; 20 MG/100ML; MG/100ML; MG/100ML; MG/100ML
30 INJECTION, SOLUTION INTRAVENOUS CONTINUOUS
Status: DISCONTINUED | OUTPATIENT
Start: 2022-08-30 | End: 2022-08-30 | Stop reason: HOSPADM

## 2022-08-30 RX ORDER — LIDOCAINE HYDROCHLORIDE 20 MG/ML
INJECTION, SOLUTION EPIDURAL; INFILTRATION; INTRACAUDAL; PERINEURAL AS NEEDED
Status: DISCONTINUED | OUTPATIENT
Start: 2022-08-30 | End: 2022-08-30 | Stop reason: SURG

## 2022-08-30 RX ORDER — SODIUM CHLORIDE 0.9 % (FLUSH) 0.9 %
3 SYRINGE (ML) INJECTION EVERY 12 HOURS SCHEDULED
Status: DISCONTINUED | OUTPATIENT
Start: 2022-08-30 | End: 2022-08-30 | Stop reason: HOSPADM

## 2022-08-30 RX ORDER — SODIUM CHLORIDE 0.9 % (FLUSH) 0.9 %
10 SYRINGE (ML) INJECTION AS NEEDED
Status: DISCONTINUED | OUTPATIENT
Start: 2022-08-30 | End: 2022-08-30 | Stop reason: HOSPADM

## 2022-08-30 RX ORDER — PROPOFOL 10 MG/ML
VIAL (ML) INTRAVENOUS AS NEEDED
Status: DISCONTINUED | OUTPATIENT
Start: 2022-08-30 | End: 2022-08-30 | Stop reason: SURG

## 2022-08-30 RX ADMIN — PROPOFOL 50 MG: 10 INJECTION, EMULSION INTRAVENOUS at 12:19

## 2022-08-30 RX ADMIN — PROPOFOL 175 MCG/KG/MIN: 10 INJECTION, EMULSION INTRAVENOUS at 12:19

## 2022-08-30 RX ADMIN — LIDOCAINE HYDROCHLORIDE 100 MG: 20 INJECTION, SOLUTION EPIDURAL; INFILTRATION; INTRACAUDAL; PERINEURAL at 12:19

## 2022-08-30 RX ADMIN — SODIUM CHLORIDE, POTASSIUM CHLORIDE, SODIUM LACTATE AND CALCIUM CHLORIDE 30 ML/HR: 600; 310; 30; 20 INJECTION, SOLUTION INTRAVENOUS at 11:27

## 2022-08-30 NOTE — ANESTHESIA PREPROCEDURE EVALUATION
Anesthesia Evaluation     Patient summary reviewed and Nursing notes reviewed   no history of anesthetic complications:  NPO Solid Status: > 8 hours  NPO Liquid Status: > 2 hours           Airway   Mallampati: II  TM distance: >3 FB  Neck ROM: full  No difficulty expected  Dental      Pulmonary - negative pulmonary ROS and normal exam    breath sounds clear to auscultation  Cardiovascular - normal exam  Exercise tolerance: good (4-7 METS)    Rhythm: regular  Rate: normal    (+) hypertension, hyperlipidemia,       Neuro/Psych  (+) psychiatric history,    GI/Hepatic/Renal/Endo    (+)   renal disease, diabetes mellitus type 2,     Musculoskeletal     Abdominal    Substance History - negative use     OB/GYN negative ob/gyn ROS         Other   arthritis,                Latest Reference Range & Units Most Recent   Glucose 65 - 99 mg/dL 103 (H)  06/01/22 08:51   Sodium 136 - 145 mmol/L 137  06/01/22 08:51   Potassium 3.5 - 5.2 mmol/L 4.2  06/01/22 08:51   CO2 22.0 - 29.0 mmol/L 25.4  06/01/22 08:51   CO2 22 - 32 mmol/L 28  05/24/21 12:07   Chloride 98 - 107 mmol/L 102  06/01/22 08:51   Anion Gap 5.0 - 15.0 mmol/L 9.6  06/01/22 08:51   Creatinine 0.57 - 1.00 mg/dL 0.97  06/01/22 08:51   BUN 8 - 23 mg/dL 14  06/01/22 08:51   BUN/Creatinine Ratio 7.0 - 25.0  14.4  06/01/22 08:51   Calcium 8.6 - 10.5 mg/dL 9.4  06/01/22 08:51   eGFR >60.0 mL/min/1.73 64.6  06/01/22 08:51   eGFR African Am >60 mL/min/1.73 71  02/11/22 12:57   Est GFR by Clearance >60 /1.73 m2 56 (L) (E)  05/22/20 10:25   Alkaline Phosphatase 39 - 117 U/L 155 (H)  06/01/22 08:51   Total Protein 6.0 - 8.5 g/dL 7.0  06/01/22 08:51   ALT (SGPT) 1 - 33 U/L 18  06/01/22 08:51   AST (SGOT) 1 - 32 U/L 19  06/01/22 08:51   Total Bilirubin 0.0 - 1.2 mg/dL 0.4  06/01/22 08:51   Albumin 3.50 - 5.20 g/dL 4.10  06/01/22 08:51   Globulin gm/dL 2.9  06/01/22 08:51   A/G Ratio g/dL 1.4  06/01/22 08:51   Phosphorus 2.5 - 4.5 mg/dL 2.9  11/02/21 13:18   GGT 5 - 36 U/L  21 06/01/22 08:51   (H): Data is abnormally high  (L): Data is abnormally low  (E): External lab result         Anesthesia Plan    ASA 2     general     (Total IV Anesthesia    Patient understands anesthesia not responsible for dental damage.  )  intravenous induction     Anesthetic plan, risks, benefits, and alternatives have been provided, discussed and informed consent has been obtained with: patient.    Plan discussed with CRNA.        CODE STATUS:

## 2022-08-30 NOTE — TELEPHONE ENCOUNTER
Hub staff attempted to follow warm transfer process and was unsuccessful     Caller: NAWAF WITH [facility]    Patient is needing: WANTED TO NOTIFY PRACTICE THAT NAWAF WITH [facility] CALLED TO SCHEDULE 1 WEEK FU WITH Alcira ARGUETA FOR DR. LANDA FOR NEXT WEEK FOR CLAUDETTE HINDS. TRIED TO CALL PRACTICE SINCE THERE'S NOTHING ON HER SCHEDULE UNTIL 9/13. UNABLE TO WT AND SHE WOULD NOT GET OFF PHONE WITHOUT SCHEDULING APPT. PUT ON DR. LANDA'S SCHEDULE FOR 9/7/22 FOR FOLLOW UP COLONOSCOPY.

## 2022-08-30 NOTE — ANESTHESIA POSTPROCEDURE EVALUATION
Patient: Wilner Jacobson    Procedure Summary     Date: 08/30/22 Room / Location: [facility] ENDOSCOPY 2 / [facility] ENDOSCOPY    Anesthesia Start: 1217 Anesthesia Stop: 1249    Procedure: COLONOSCOPY WITH POLYPECTOMY/SNARE (N/A ) Diagnosis:       Screening for malignant neoplasm of colon      (Screening for malignant neoplasm of colon [Z12.11])    Surgeons: Eren Stanley MD Provider: Erick Cole MD    Anesthesia Type: general ASA Status: 2          Anesthesia Type: general    Vitals  Vitals Value Taken Time   /77 08/30/22 1253   Temp 36.7 °C (98 °F) 08/30/22 1248   Pulse 64 08/30/22 1253   Resp 18 08/30/22 1253   SpO2 100 % 08/30/22 1253           Post Anesthesia Care and Evaluation    Patient location during evaluation: bedside  Patient participation: complete - patient participated  Level of consciousness: awake  Pain score: 0  Pain management: adequate    Airway patency: patent  Anesthetic complications: No anesthetic complications  PONV Status: none  Cardiovascular status: acceptable and stable  Respiratory status: acceptable and room air  Hydration status: acceptable    Comments: An Anesthesiologist personally participated in the most demanding procedures (including induction and emergence if applicable) in the anesthesia plan, monitored the course of anesthesia administration at frequent intervals and remained physically present and available for immediate diagnosis and treatment of emergencies.

## 2022-09-01 LAB
CYTO UR: NORMAL
LAB AP CASE REPORT: NORMAL
LAB AP CLINICAL INFORMATION: NORMAL
PATH REPORT.FINAL DX SPEC: NORMAL
PATH REPORT.GROSS SPEC: NORMAL

## 2022-09-02 ENCOUNTER — LAB (OUTPATIENT)
Dept: FAMILY MEDICINE CLINIC | Facility: CLINIC | Age: 67
End: 2022-09-02

## 2022-09-07 ENCOUNTER — OFFICE VISIT (OUTPATIENT)
Dept: SURGERY | Facility: CLINIC | Age: 67
End: 2022-09-07

## 2022-09-07 VITALS — HEIGHT: 64 IN | WEIGHT: 246 LBS | RESPIRATION RATE: 14 BRPM | BODY MASS INDEX: 42 KG/M2

## 2022-09-07 DIAGNOSIS — K63.5 POLYP OF COLON, UNSPECIFIED PART OF COLON, UNSPECIFIED TYPE: Primary | ICD-10-CM

## 2022-09-07 PROCEDURE — 99213 OFFICE O/P EST LOW 20 MIN: CPT | Performed by: SURGERY

## 2022-09-07 RX ORDER — POLYETHYLENE GLYCOL 3350 17 G/DOSE
POWDER (GRAM) ORAL
COMMUNITY
Start: 2022-08-28 | End: 2022-11-08

## 2022-09-08 NOTE — PROGRESS NOTES
General Surgery/Colorectal Surgery Note    Patient Name:  Wilner Jacobson  YOB: 1955  [id number]    Referring Provider: No ref. provider found      Patient Care Team:  Abrahan Rick DO as PCP - General (Family Medicine)  Alcira Arroyo APRN as Nurse Practitioner (Nurse Practitioner)    Chief complaint follow-up endoscopy    Subjective .     History of present illness:    Status post colonoscopy 8/30/2022 with 2 polyps removed ascending colon.  Pathology with tubular adenoma, hyperplastic polyp.    She comes in for follow-up.  No changes since last seen.      History:  Past Medical History:   Diagnosis Date   • Arthritis    • Bladder disorder    • Colon polyp 8/31/22   • Eating disorder    • Hematuria    • High cholesterol    • HL (hearing loss) 2020   • Hyperlipemia    • Hypertension    • Obesity    • Renal cyst     right renal cyst   • Visual impairment        Past Surgical History:   Procedure Laterality Date   • COLONOSCOPY     • COLONOSCOPY N/A 08/30/2022    Procedure: COLONOSCOPY WITH POLYPECTOMY/SNARE;  Surgeon: Eren Stanley MD;  Location: [facility] ENDOSCOPY;  Service: General;  Laterality: N/A;  COLON POLYPS   • CYSTOSCOPY      2/27/13-office flexible cystoscopy w/ Dr. Bledsoe 3/14/13 Cysto, biopsy, flug w/ Dr. Bledsoe   • JOINT REPLACEMENT     • KNEE SURGERY Left 12/2020    knee replcement       Family History   Problem Relation Age of Onset   • Arthritis Mother    • Diabetes Son         He no longer has it   • Malig Hyperthermia Neg Hx        Social History     Tobacco Use   • Smoking status: Never Smoker   • Smokeless tobacco: Never Used   Vaping Use   • Vaping Use: Never used   Substance Use Topics   • Alcohol use: Yes     Alcohol/week: 1.0 standard drink     Types: 1 Glasses of wine per week     Comment: occasionally drinks, has been drinking for 31 or more years    • Drug use: Never       Review of Systems  All systems were reviewed and negative except for:   Review of Systems    Constitutional: Negative for chills, fever and unexpected weight loss.   HENT: Negative for congestion, nosebleeds and voice change.    Eyes: Negative for blurred vision, double vision and discharge.   Respiratory: Negative for apnea, chest tightness and shortness of breath.    Cardiovascular: Negative for chest pain and leg swelling.   Gastrointestinal:        See HPI   Endocrine: Negative for cold intolerance and heat intolerance.   Genitourinary: Negative for dysuria, hematuria and urgency.   Musculoskeletal: Negative for back pain, joint swelling and neck pain.   Skin: Negative for color change and dry skin.   Neurological: Negative for dizziness and confusion.   Hematological: Negative for adenopathy.   Psychiatric/Behavioral: Negative for agitation and behavioral problems.     MEDS:  Prior to Admission medications    Medication Sig Start Date End Date Taking? Authorizing Provider   atorvastatin (LIPITOR) 40 MG tablet Take 1 tablet by mouth Daily. 7/19/22  Yes Abrahan Rick DO   Diclofenac Sodium (VOLTAREN) 1 % gel gel Apply 4 g topically to the appropriate area as directed 4 (Four) Times a Day As Needed (AS NEEDED). 6/13/22  Yes Dada Torres MD   hydroCHLOROthiazide (HYDRODIURIL) 25 MG tablet Take 1 tablet by mouth Daily. 6/1/22  Yes Abrahan Rick DO   lisinopril (PRINIVIL,ZESTRIL) 40 MG tablet Take 1 tablet by mouth Daily. 7/19/22  Yes Abrahan Rick DO   amoxicillin (AMOXIL) 500 MG capsule TAKE 4 CAPSULES BY MOUTH ONE HOUR BEFORE DENTAL PROCEDURE 6/20/22   Joann Sarabia MD   [facility] Purelax 17 GM/SCOOP powder TAKE AS DIRECTED. INSTRUCTIONS GIVEN IN OFFICE. DISPENSE: 238 G BOTTLE 8/28/22   Joann Sarabia MD        Allergies:  Patient has no known allergies.    Objective     Vital Signs   Resp:  [14] 14    Physical Exam:     General Appearance:    Alert, cooperative, in no acute distress   Head:    Normocephalic, without obvious abnormality, atraumatic   Eyes:           "Conjunctivae and sclerae normal, no icterus,     Ears:    Ears appear intact with no abnormalities noted   Throat:   No oral lesions, no thrush, oral mucosa moist   Neck:   No adenopathy, supple, trachea midline, no thyromegaly   Back:     No kyphosis present, no scoliosis present, no skin lesions,      erythema or scars, no tenderness to percussion or                   palpation,   range of motion normal   Lungs:     Clear to auscultation,respirations regular, even and                  unlabored    Heart:    Regular rhythm and normal rate, normal S1 and S2, no            murmur, no gallop, no rub, no click   Chest Wall:    No abnormalities observed   Abdomen:     Normal bowel sounds, no masses, no organomegaly, soft        non-tender, non-distended, no guarding, no rebound                tenderness   Rectal:        Extremities:   Moves all extremities well, no edema, no cyanosis, no             redness   Pulses:   Pulses palpable and equal bilaterally   Skin:   No bleeding, bruising or rash   Lymph nodes:   No palpable adenopathy   Neurologic:   A/o x 4 with no deficits       Results Review:   {Results Review:00572::\"I reviewed the patient's new clinical results.\"    LABS/IMAGING:  Results for orders placed or performed during the hospital encounter of 08/30/22   Tissue Pathology Exam    Specimen: Large Intestine, Right / Ascending Colon; Polyp   Result Value Ref Range    Case Report       Surgical Pathology Report                         Case: [id number]                                  Authorizing Provider:  Eren Stanley MD  Collected:           08/30/2022 12:36 PM          Ordering Location:     [facility] Received:            08/31/2022 08:36 AM                                 SUITES                                                                       Pathologist:           Ander Grewal MD                                                            Specimen:    Large Intestine, Right " "/ Ascending Colon, ASCENDING COLON POLYPS                           Clinical Information       Screening for malignant neoplasm of colon      Final Diagnosis       Ascending colon polyps, biopsy:    - Fragments of tubular adenoma and hyperplastic polyp        Gross Description       1. Large Intestine, Right / Ascending Colon.  Received in formalin and labeled \" ascending colon polyps\" are two fragments of tan soft tissue measuring 0.3-0.4 cm in greatest dimension. The specimen is entirely submitted in one cassette.    ALLISON        Microscopic Description          Result Review :     Assessment & Plan     Status post colonoscopy 8/30/2022 with 2 polyps removed ascending colon.  Pathology with tubular adenoma, hyperplastic polyp.    I reviewed the pathology with the patient.  Next colonoscopy in 5 years.  All questions answered.  She agrees with the plan.  Thank for the consult.           This document has been electronically signed by Eren Stanley MD  September 8, 2022 09:38 EDT  "

## 2022-10-31 ENCOUNTER — OFFICE VISIT (OUTPATIENT)
Dept: FAMILY MEDICINE CLINIC | Facility: CLINIC | Age: 67
End: 2022-10-31

## 2022-10-31 VITALS
RESPIRATION RATE: 18 BRPM | OXYGEN SATURATION: 100 % | BODY MASS INDEX: 42.28 KG/M2 | HEIGHT: 63 IN | WEIGHT: 238.6 LBS | TEMPERATURE: 98.3 F | DIASTOLIC BLOOD PRESSURE: 68 MMHG | SYSTOLIC BLOOD PRESSURE: 118 MMHG | HEART RATE: 79 BPM

## 2022-10-31 DIAGNOSIS — L03.115 CELLULITIS OF RIGHT FOOT: Primary | ICD-10-CM

## 2022-10-31 DIAGNOSIS — Z51.81 MEDICATION MONITORING ENCOUNTER: ICD-10-CM

## 2022-10-31 DIAGNOSIS — I10 PRIMARY HYPERTENSION: ICD-10-CM

## 2022-10-31 DIAGNOSIS — N17.9 AKI (ACUTE KIDNEY INJURY): ICD-10-CM

## 2022-10-31 DIAGNOSIS — M79.671 RIGHT FOOT PAIN: ICD-10-CM

## 2022-10-31 DIAGNOSIS — R31.9 HEMATURIA, UNSPECIFIED TYPE: ICD-10-CM

## 2022-10-31 LAB
ALBUMIN SERPL-MCNC: 4.2 G/DL (ref 3.5–5.2)
ALBUMIN SERPL-MCNC: 4.3 G/DL (ref 3.5–5.2)
ALBUMIN/GLOB SERPL: 2 G/DL
ALP SERPL-CCNC: 157 U/L (ref 39–117)
ALT SERPL W P-5'-P-CCNC: 27 U/L (ref 1–33)
ANION GAP SERPL CALCULATED.3IONS-SCNC: 10 MMOL/L (ref 5–15)
ANION GAP SERPL CALCULATED.3IONS-SCNC: 10.6 MMOL/L (ref 5–15)
AST SERPL-CCNC: 28 U/L (ref 1–32)
BASOPHILS # BLD AUTO: 0.03 10*3/MM3 (ref 0–0.2)
BASOPHILS NFR BLD AUTO: 0.6 % (ref 0–1.5)
BILIRUB SERPL-MCNC: 0.6 MG/DL (ref 0–1.2)
BUN SERPL-MCNC: 13 MG/DL (ref 8–23)
BUN SERPL-MCNC: 13 MG/DL (ref 8–23)
BUN/CREAT SERPL: 12.9 (ref 7–25)
BUN/CREAT SERPL: 13.3 (ref 7–25)
CALCIUM SPEC-SCNC: 9.7 MG/DL (ref 8.6–10.5)
CALCIUM SPEC-SCNC: 9.8 MG/DL (ref 8.6–10.5)
CHLORIDE SERPL-SCNC: 99 MMOL/L (ref 98–107)
CHLORIDE SERPL-SCNC: 99 MMOL/L (ref 98–107)
CO2 SERPL-SCNC: 27.4 MMOL/L (ref 22–29)
CO2 SERPL-SCNC: 28 MMOL/L (ref 22–29)
CREAT SERPL-MCNC: 0.98 MG/DL (ref 0.57–1)
CREAT SERPL-MCNC: 1.01 MG/DL (ref 0.57–1)
DEPRECATED RDW RBC AUTO: 39.8 FL (ref 37–54)
EGFRCR SERPLBLD CKD-EPI 2021: 61.1 ML/MIN/1.73
EGFRCR SERPLBLD CKD-EPI 2021: 63.4 ML/MIN/1.73
EOSINOPHIL # BLD AUTO: 0.07 10*3/MM3 (ref 0–0.4)
EOSINOPHIL NFR BLD AUTO: 1.3 % (ref 0.3–6.2)
ERYTHROCYTE [DISTWIDTH] IN BLOOD BY AUTOMATED COUNT: 13 % (ref 12.3–15.4)
GLOBULIN UR ELPH-MCNC: 2.1 GM/DL
GLUCOSE SERPL-MCNC: 95 MG/DL (ref 65–99)
GLUCOSE SERPL-MCNC: 95 MG/DL (ref 65–99)
HCT VFR BLD AUTO: 37.2 % (ref 34–46.6)
HGB BLD-MCNC: 12.4 G/DL (ref 12–15.9)
IMM GRANULOCYTES # BLD AUTO: 0.02 10*3/MM3 (ref 0–0.05)
IMM GRANULOCYTES NFR BLD AUTO: 0.4 % (ref 0–0.5)
LYMPHOCYTES # BLD AUTO: 1.6 10*3/MM3 (ref 0.7–3.1)
LYMPHOCYTES NFR BLD AUTO: 30 % (ref 19.6–45.3)
MCH RBC QN AUTO: 28.1 PG (ref 26.6–33)
MCHC RBC AUTO-ENTMCNC: 33.3 G/DL (ref 31.5–35.7)
MCV RBC AUTO: 84.2 FL (ref 79–97)
MONOCYTES # BLD AUTO: 0.4 10*3/MM3 (ref 0.1–0.9)
MONOCYTES NFR BLD AUTO: 7.5 % (ref 5–12)
NEUTROPHILS NFR BLD AUTO: 3.22 10*3/MM3 (ref 1.7–7)
NEUTROPHILS NFR BLD AUTO: 60.2 % (ref 42.7–76)
NRBC BLD AUTO-RTO: 0 /100 WBC (ref 0–0.2)
PHOSPHATE SERPL-MCNC: 3.3 MG/DL (ref 2.5–4.5)
PLATELET # BLD AUTO: 323 10*3/MM3 (ref 140–450)
PMV BLD AUTO: 11.4 FL (ref 6–12)
POTASSIUM SERPL-SCNC: 4.4 MMOL/L (ref 3.5–5.2)
POTASSIUM SERPL-SCNC: 4.6 MMOL/L (ref 3.5–5.2)
PROT SERPL-MCNC: 6.3 G/DL (ref 6–8.5)
RBC # BLD AUTO: 4.42 10*6/MM3 (ref 3.77–5.28)
SODIUM SERPL-SCNC: 137 MMOL/L (ref 136–145)
SODIUM SERPL-SCNC: 137 MMOL/L (ref 136–145)
URATE SERPL-MCNC: 6.7 MG/DL (ref 2.4–5.7)
WBC NRBC COR # BLD: 5.34 10*3/MM3 (ref 3.4–10.8)

## 2022-10-31 PROCEDURE — 84550 ASSAY OF BLOOD/URIC ACID: CPT | Performed by: FAMILY MEDICINE

## 2022-10-31 PROCEDURE — 84100 ASSAY OF PHOSPHORUS: CPT | Performed by: FAMILY MEDICINE

## 2022-10-31 PROCEDURE — 99213 OFFICE O/P EST LOW 20 MIN: CPT | Performed by: FAMILY MEDICINE

## 2022-10-31 PROCEDURE — 80053 COMPREHEN METABOLIC PANEL: CPT | Performed by: FAMILY MEDICINE

## 2022-10-31 PROCEDURE — 85025 COMPLETE CBC W/AUTO DIFF WBC: CPT | Performed by: FAMILY MEDICINE

## 2022-10-31 NOTE — PROGRESS NOTES
"Chief Complaint  Right foot pain  Kidney injury  Diarrhea    Subjective        Wilner Jacobson presents to [facility]  History of Present Illness  Patient presents today for an acute visit.  She is following up from an urgent care visit on 10/15/2022 where she was diagnosed with cellulitis of the right foot and she was placed on Bactrim.  She reports that she went to [place] a few days later.  On 10/24/2022 she was seen while visiting family as well.  On 10/24/2022 her labs showed that her creatinine was 2.8 and her white blood cell count was 3.1.  She also had blood in her urine.  She reports having some itching taking Bactrim.  She reports that the swelling has improved.  She is still having a little bit of pain.  She did contact nephrology, Dr. Mukherjee who had requested a renal function panel.  I will go ahead and place this order as patient will need follow-up.  She does have an appointment to see nephrology this Friday.  She reports that she is starting to feel better.  She did have diarrhea as well when she was seen in [place] but this has resolved.  Her renal function when we last checked it on 6/1/2022 was normal with a creatinine of 0.97.  Objective   Vital Signs:  /68 (BP Location: Left arm, Patient Position: Sitting)   Pulse 79   Temp 98.3 °F (36.8 °C) (Oral)   Resp 18   Ht 160 cm (63\")   Wt 108 kg (238 lb 9.6 oz)   SpO2 100%   BMI 42.27 kg/m²   Estimated body mass index is 42.27 kg/m² as calculated from the following:    Height as of this encounter: 160 cm (63\").    Weight as of this encounter: 108 kg (238 lb 9.6 oz).          Physical Exam   General: AAO ×3, no acute distress, pleasant  HEENT: Normocephalic, atraumatic  Cardiovascular: Regular rate and rhythm without appreciable murmur  Respiratory: Clear to auscultation bilaterally no RRW  Gastrointestinal: Soft nontender nondistended with bowel sounds present  Skin: No erythema noted.  She is slightly tender " to palpation over the first MTP joint of the right foot but has good range of motion.  Musculoskeletal: Good range of motion of the first MTP joint of the right foot.  Slightly tender to palpation.  extremities: No edema  Neurologic: CN II through XII grossly intact   Psychiatric: Normal mood and affect  Result Review :                Assessment and Plan   Diagnoses and all orders for this visit:    1. Cellulitis of right foot (Primary)    2. Right foot pain  -     XR Foot 3+ View Right; Future  -     Uric Acid    3. Hematuria, unspecified type  -     Urinalysis With Microscopic If Indicated (No Culture) - Urine, Clean Catch  -     Urine Culture - Urine, Urine, Clean Catch    4. SAIRA (acute kidney injury) (HCC)  -     Comprehensive Metabolic Panel  -     Renal Function Panel    5. Medication monitoring encounter  -     Comprehensive Metabolic Panel  -     CBC & Differential  -     Urinalysis With Microscopic If Indicated (No Culture) - Urine, Clean Catch  -     Urine Culture - Urine, Urine, Clean Catch    6. Primary hypertension    Plan to get an x-ray of the right foot for further evaluation.  Her symptoms are improving.  The differential for her illness would include gout as well.  We will check a uric acid level.  We discussed holding off on further antibiotics at this time as I do not see any evidence of cellulitis on exam.  Plan to get blood work drawn today.  Patient encouraged to stay well-hydrated.  Blood pressure has been adequately controlled.  I plan to see her back for her next regular scheduled appointment or sooner if needed.         Follow Up   Return if symptoms worsen or fail to improve.  Patient was given instructions and counseling regarding her condition or for health maintenance advice. Please see specific information pulled into the AVS if appropriate.

## 2022-11-01 ENCOUNTER — HOSPITAL ENCOUNTER (OUTPATIENT)
Dept: GENERAL RADIOLOGY | Facility: HOSPITAL | Age: 67
Discharge: HOME | End: 2022-11-01
Admitting: FAMILY MEDICINE

## 2022-11-01 DIAGNOSIS — M79.671 RIGHT FOOT PAIN: ICD-10-CM

## 2022-11-01 DIAGNOSIS — R31.9 HEMATURIA, UNSPECIFIED TYPE: Primary | ICD-10-CM

## 2022-11-01 PROCEDURE — 73630 X-RAY EXAM OF FOOT: CPT

## 2022-11-04 ENCOUNTER — LAB (OUTPATIENT)
Dept: FAMILY MEDICINE CLINIC | Facility: CLINIC | Age: 67
End: 2022-11-04

## 2022-11-04 ENCOUNTER — TELEPHONE (OUTPATIENT)
Dept: FAMILY MEDICINE CLINIC | Facility: CLINIC | Age: 67
End: 2022-11-04

## 2022-11-04 DIAGNOSIS — R31.9 HEMATURIA, UNSPECIFIED TYPE: ICD-10-CM

## 2022-11-04 LAB
BACTERIA UR QL AUTO: ABNORMAL /HPF
BILIRUB UR QL STRIP: NEGATIVE
CLARITY UR: CLEAR
COLOR UR: YELLOW
GLUCOSE UR STRIP-MCNC: NEGATIVE MG/DL
HGB UR QL STRIP.AUTO: ABNORMAL
HYALINE CASTS UR QL AUTO: ABNORMAL /LPF
KETONES UR QL STRIP: NEGATIVE
LEUKOCYTE ESTERASE UR QL STRIP.AUTO: NEGATIVE
NITRITE UR QL STRIP: NEGATIVE
PH UR STRIP.AUTO: 5.5 [PH] (ref 5–8)
PROT UR QL STRIP: NEGATIVE
RBC # UR STRIP: ABNORMAL /HPF
REF LAB TEST METHOD: ABNORMAL
SP GR UR STRIP: 1.01 (ref 1–1.03)
SQUAMOUS #/AREA URNS HPF: ABNORMAL /HPF
UROBILINOGEN UR QL STRIP: ABNORMAL
WBC # UR STRIP: ABNORMAL /HPF

## 2022-11-04 PROCEDURE — 87086 URINE CULTURE/COLONY COUNT: CPT | Performed by: FAMILY MEDICINE

## 2022-11-04 PROCEDURE — 81001 URINALYSIS AUTO W/SCOPE: CPT | Performed by: FAMILY MEDICINE

## 2022-11-05 LAB — BACTERIA SPEC AEROBE CULT: NO GROWTH

## 2022-11-08 ENCOUNTER — HOSPITAL ENCOUNTER (OUTPATIENT)
Dept: MAMMOGRAPHY | Facility: HOSPITAL | Age: 67
Discharge: HOME | End: 2022-11-08
Admitting: OBSTETRICS & GYNECOLOGY

## 2022-11-08 ENCOUNTER — APPOINTMENT (OUTPATIENT)
Dept: GENERAL RADIOLOGY | Facility: HOSPITAL | Age: 67
End: 2022-11-08

## 2022-11-08 ENCOUNTER — HOSPITAL ENCOUNTER (EMERGENCY)
Facility: HOSPITAL | Age: 67
Discharge: HOME | End: 2022-11-08
Attending: EMERGENCY MEDICINE | Admitting: EMERGENCY MEDICINE

## 2022-11-08 VITALS
DIASTOLIC BLOOD PRESSURE: 71 MMHG | WEIGHT: 235.67 LBS | OXYGEN SATURATION: 99 % | HEART RATE: 74 BPM | RESPIRATION RATE: 19 BRPM | TEMPERATURE: 98.2 F | SYSTOLIC BLOOD PRESSURE: 104 MMHG | HEIGHT: 64 IN | BODY MASS INDEX: 40.23 KG/M2

## 2022-11-08 DIAGNOSIS — Z12.31 SCREENING MAMMOGRAM FOR BREAST CANCER: ICD-10-CM

## 2022-11-08 DIAGNOSIS — M10.071 ACUTE IDIOPATHIC GOUT OF RIGHT FOOT: ICD-10-CM

## 2022-11-08 DIAGNOSIS — L03.115 CELLULITIS OF FOOT WITHOUT TOES, RIGHT: Primary | ICD-10-CM

## 2022-11-08 PROCEDURE — 99283 EMERGENCY DEPT VISIT LOW MDM: CPT

## 2022-11-08 PROCEDURE — 77063 BREAST TOMOSYNTHESIS BI: CPT

## 2022-11-08 PROCEDURE — 96372 THER/PROPH/DIAG INJ SC/IM: CPT

## 2022-11-08 PROCEDURE — 77067 SCR MAMMO BI INCL CAD: CPT

## 2022-11-08 PROCEDURE — 73620 X-RAY EXAM OF FOOT: CPT

## 2022-11-08 PROCEDURE — 25010000002 CEFAZOLIN PER 500 MG: Performed by: EMERGENCY MEDICINE

## 2022-11-08 RX ORDER — CEPHALEXIN 500 MG/1
500 CAPSULE ORAL 2 TIMES DAILY
COMMUNITY
End: 2022-11-25

## 2022-11-08 RX ORDER — HYDROCODONE BITARTRATE AND ACETAMINOPHEN 5; 325 MG/1; MG/1
1 TABLET ORAL EVERY 6 HOURS PRN
Status: DISCONTINUED | OUTPATIENT
Start: 2022-11-08 | End: 2022-11-08 | Stop reason: HOSPADM

## 2022-11-08 RX ORDER — CEFAZOLIN SODIUM 1 G/3ML
1 INJECTION, POWDER, FOR SOLUTION INTRAMUSCULAR; INTRAVENOUS ONCE
Status: COMPLETED | OUTPATIENT
Start: 2022-11-08 | End: 2022-11-08

## 2022-11-08 RX ORDER — LIDOCAINE 50 MG/G
1 PATCH TOPICAL EVERY 24 HOURS
Qty: 5 PATCH | Refills: 0 | Status: SHIPPED | OUTPATIENT
Start: 2022-11-08 | End: 2023-01-10

## 2022-11-08 RX ADMIN — HYDROCODONE BITARTRATE AND ACETAMINOPHEN 1 TABLET: 5; 325 TABLET ORAL at 04:11

## 2022-11-08 RX ADMIN — CEFAZOLIN SODIUM 1 G: 1 INJECTION, POWDER, FOR SOLUTION INTRAMUSCULAR; INTRAVENOUS at 04:05

## 2022-11-08 NOTE — DISCHARGE INSTRUCTIONS
Continue your Keflex as prescribed by your PCP.    Call him today to discuss any pain medication and management or speak with Dr. Mukherjee and ask him what you can have    Take tylenol for now    Use home voltaren gel and newly prescribed lidoderm patches    Elevate foot as much as possible    Use your walker for ambulation

## 2022-11-08 NOTE — ED PROVIDER NOTES
Time: 04:21 EST  Arrived by: Vehicle  Chief Complaint: Right foot pain  History provided by: Patient  History is limited by: N/A    History of Present Illness:  Patient is a 67 y.o. year old female that presents to the emergency department with right foot pain since getting infection after pedicure.  Recently put on Keflex on Friday by dr weller office and told Monday by PCP that it was gout.  No medications prescribed.  Came in tonight because pain is so severe that she cannot walk because it hurts to put weight on it      History provided by:  Patient  Foot Pain  Location:  Right  Quality:  Aching  Severity:  Moderate  Onset quality:  Gradual  Duration:  1 month  Timing:  Constant  Progression:  Waxing and waning  Chronicity:  New  Context:  Constant pain in right foot  Relieved by:  Nothing  Worsened by:  Bearing weight and touch  Associated symptoms: no abdominal pain, no chest pain, no cough, no diarrhea, no ear pain, no fatigue, no fever, no headaches, no myalgias, no rash, no rhinorrhea, no shortness of breath, no sore throat and no vomiting        Similar Symptoms Previously: no    Recently seen: seen by urgent care 1 month ago, dr mccormack on monday and dr weller friday      Patient Care Team  Primary Care Provider: easton    Past Medical History:     No Known Allergies  Past Medical History:   Diagnosis Date   • Arthritis    • Bladder disorder    • Colon polyp 8/31/22   • Eating disorder    • Hematuria    • High cholesterol    • HL (hearing loss) 2020   • Hyperlipemia    • Hypertension    • Obesity    • Renal cyst     right renal cyst   • Visual impairment      Past Surgical History:   Procedure Laterality Date   • COLONOSCOPY     • COLONOSCOPY N/A 08/30/2022    Procedure: COLONOSCOPY WITH POLYPECTOMY/SNARE;  Surgeon: Eren Stanley MD;  Location: [facility] ENDOSCOPY;  Service: General;  Laterality: N/A;  COLON POLYPS   • CYSTOSCOPY      2/27/13-office flexible cystoscopy w/ Dr. Bledsoe 3/14/13  Cysto, biopsy, flug w/ Dr. Bledsoe   • JOINT REPLACEMENT     • KNEE SURGERY Left 12/2020    knee replcement     Family History   Problem Relation Age of Onset   • Arthritis Mother    • Diabetes Son         He no longer has it   • Malig Hyperthermia Neg Hx        Home Medications:  Prior to Admission medications    Medication Sig Start Date End Date Taking? Authorizing Provider   atorvastatin (LIPITOR) 40 MG tablet Take 1 tablet by mouth Daily. 7/19/22   Abrahan Rick DO   [employer] Purelax 17 GM/SCOOP powder TAKE AS DIRECTED. INSTRUCTIONS GIVEN IN OFFICE. DISPENSE: 238 G BOTTLE 8/28/22   Provider, MD Joann   Diclofenac Sodium (VOLTAREN) 1 % gel gel Apply 4 g topically to the appropriate area as directed 4 (Four) Times a Day As Needed (AS NEEDED). 6/13/22   Dada Torres MD   hydroCHLOROthiazide (HYDRODIURIL) 25 MG tablet Take 1 tablet by mouth Daily. 6/1/22   Abrahan Rick DO   lisinopril (PRINIVIL,ZESTRIL) 40 MG tablet Take 1 tablet by mouth Daily. 7/19/22   Abrahan Rick DO   sulfamethoxazole-trimethoprim (BACTRIM DS,SEPTRA DS) 800-160 MG per tablet Take 1 tablet by mouth 2 (Two) Times a Day. 10/15/22   Jose Lagos APRN        Social History:   PT  reports that she has never smoked. She has never used smokeless tobacco. She reports current alcohol use of about 1.0 standard drink per week. She reports that she does not use drugs.    Record Review:  I have reviewed the patient's records in [facility].     Review of Systems  Review of Systems   Constitutional: Negative for chills, fatigue and fever.   HENT: Negative for ear pain, rhinorrhea and sore throat.    Eyes: Negative for visual disturbance.   Respiratory: Negative for cough and shortness of breath.    Cardiovascular: Negative for chest pain.   Gastrointestinal: Negative for abdominal pain, diarrhea and vomiting.   Genitourinary: Negative for difficulty urinating and flank pain.   Musculoskeletal: Positive for arthralgias ( Right foot pain),  "gait problem ( Too painful to walk now) and joint swelling ( Right foot). Negative for back pain and myalgias.   Skin: Negative for rash.   Neurological: Negative for light-headedness and headaches.   Hematological: Negative for adenopathy.   Psychiatric/Behavioral: Negative.         Physical Exam  /71   Pulse 74   Temp 98.2 °F (36.8 °C)   Resp 19   Ht 162.6 cm (64\")   Wt 107 kg (235 lb 10.8 oz)   SpO2 99%   BMI 40.45 kg/m²     Physical Exam  Vitals and nursing note reviewed.   Constitutional:       General: She is not in acute distress.     Appearance: Normal appearance. She is not toxic-appearing.   HENT:      Head: Normocephalic and atraumatic.      Nose: Nose normal.      Mouth/Throat:      Mouth: Mucous membranes are moist.   Eyes:      Conjunctiva/sclera: Conjunctivae normal.   Cardiovascular:      Pulses: Normal pulses.   Pulmonary:      Effort: Pulmonary effort is normal.   Abdominal:      Tenderness: There is no abdominal tenderness.   Musculoskeletal:         General: Swelling ( Mild dorsal right foot swelling) and tenderness ( Diffuse right foot) present.      Cervical back: Normal range of motion.      Right lower leg: Edema ( Trace ankle) present.      Left lower leg: Edema ( Trace ankle) present.   Skin:     General: Skin is warm and dry.      Capillary Refill: Capillary refill takes less than 2 seconds.      Findings: No erythema.      Comments: Skin warm to touch.  No lesions or abscess   Neurological:      General: No focal deficit present.      Mental Status: She is alert and oriented to person, place, and time.      Sensory: No sensory deficit.      Motor: No weakness.      Gait: Gait abnormal ( Patient reports unable to bear weight due to pain).   Psychiatric:         Mood and Affect: Mood normal.         Behavior: Behavior normal.         Thought Content: Thought content normal.         Judgment: Judgment normal.          ED Course  /71   Pulse 74   Temp 98.2 °F (36.8 °C)   " "Resp 19   Ht 162.6 cm (64\")   Wt 107 kg (235 lb 10.8 oz)   SpO2 99%   BMI 40.45 kg/m²   Results for orders placed or performed in visit on 11/04/22   Urine Culture - Urine, Urine, Clean Catch    Specimen: Urine, Clean Catch   Result Value Ref Range    Urine Culture No growth    Urinalysis With Microscopic If Indicated (No Culture) - Urine, Clean Catch    Specimen: Urine, Clean Catch   Result Value Ref Range    Color, UA Yellow Yellow, Straw    Appearance, UA Clear Clear    pH, UA 5.5 5.0 - 8.0    Specific Gravity, UA 1.013 1.005 - 1.030    Glucose, UA Negative Negative    Ketones, UA Negative Negative    Bilirubin, UA Negative Negative    Blood, UA Small (1+) (A) Negative    Protein, UA Negative Negative    Leuk Esterase, UA Negative Negative    Nitrite, UA Negative Negative    Urobilinogen, UA 1.0 E.U./dL 0.2 - 1.0 E.U./dL   Urinalysis, Microscopic Only - Urine, Clean Catch    Specimen: Urine, Clean Catch   Result Value Ref Range    RBC, UA 6-12 (A) None Seen, 0-2 /HPF    WBC, UA 0-2 None Seen, 0-2 /HPF    Bacteria, UA None Seen None Seen /HPF    Squamous Epithelial Cells, UA None Seen None Seen, 0-2 /HPF    Hyaline Casts, UA None Seen None Seen /LPF    Methodology Automated Microscopy      Medications   HYDROcodone-acetaminophen (NORCO) 5-325 MG per tablet 1 tablet (1 tablet Oral Given 11/8/22 2891)   ceFAZolin (ANCEF) injection 1 g (1 g Intramuscular Given 11/8/22 0405)     XR Foot 2 View Right    Result Date: 11/8/2022  Narrative: PROCEDURE: XR FOOT 2 VW RIGHT  COMPARISON: 11/1/2022.  INDICATIONS: 67-YEAR-OLD FEMALE W/ RIGHT FOOT PAIN & SWELLING.  FINDINGS:  Two views are provided for review.  Diffuse soft tissue swelling is seen involving the right foot, which is new or increased in degree since the prior study.  Mild enthesopathy involves the right foot, especially the plantar right calcaneal region.  There are arterial calcifications.  No subcutaneous emphysema.  No retained radiopaque foreign body.  " There is subchondral lucency and mild cortical bone loss along the medial base of the right 1st proximal phalanx.  This finding is nonspecific.  It may be related to subchondral cyst and degenerative change.  Osteomyelitis cannot be excluded.  Consider right foot MRI examination for further imaging assessment if clinically warranted, if not contraindicated, and if not recently performed.  No acute fracture or acute malalignment is appreciated.      Impression:   New or increased soft tissue swelling is suggested.  No subcutaneous emphysema.  No retained radiopaque foreign body.  Otherwise, probably no significant change is seen since the 11/1/2022 study.     Please note that portions of this note were completed with a voice recognition program.  ELADIA ISBELL JR, MD       Electronically Signed and Approved By: ELADIA ISBELL JR, MD on 11/08/2022 at 2:12              XR Foot 3+ View Right    Result Date: 11/1/2022  Narrative: PROCEDURE: XR FOOT 3+ VW RIGHT  COMPARISON: [facility], CR, XR FOOT 3+ VW RIGHT, 11/05/2021, 12:53.  INDICATIONS: PAIN IN THE FIRST MCP JOINT. RECENT INFECTION IN GREAT TOE.  FINDINGS:  There is increasing lucency identified at the base of the proximal phalanx along the medial aspect of the 1st MTP joint.  There are cystic degenerative changes in the distal aspect of the 1st metatarsal which are stable.  No other fractures or subluxation identified.      Impression:  Increasing lucency in the base of proximal phalanx of the great toe.  Given history of recent infection, osteomyelitis is not excluded.      Cam Green MD       Electronically Signed and Approved By: Cam Green MD on 11/01/2022 at 13:09           Medical Decision Making:                     MDM  Number of Diagnoses or Management Options  Acute idiopathic gout of right foot  Cellulitis of foot without toes, right  Diagnosis management comments: I have spoken with the patient. I have explained the  patient´s condition, diagnoses and treatment plan based on the information available to me at this time. I have answered the patient's questions and addressed any concerns. The patient has a good  understanding of the patient´s diagnosis, condition, and treatment plan as can be expected at this point. The vital signs have been stable. The patient´s condition is stable and appropriate for discharge from the emergency department.      The patient will pursue further outpatient evaluation with the primary care physician or other designated or consulting physician as outlined in the discharge instructions. They are agreeable to this plan of care and follow-up instructions have been explained in detail. The patient has received these instructions in written format and have expressed an understanding of the discharge instructions. The patient is aware that any significant change in condition or worsening of symptoms should prompt an immediate return to this or the closest emergency department or call to 911.         Amount and/or Complexity of Data Reviewed  Tests in the radiology section of CPT®: reviewed and ordered  Tests in the medicine section of CPT®: ordered and reviewed  Review and summarize past medical records: yes (Reviewed lab work ordered a week ago by PCP indicating elevated uric acid of 6.7)    Risk of Complications, Morbidity, and/or Mortality  Presenting problems: low  Diagnostic procedures: low  Management options: low        Final diagnoses:   Cellulitis of foot without toes, right   Acute idiopathic gout of right foot        Disposition:  ED Disposition     ED Disposition   Discharge    Condition   Stable    Comment   --              Nga Minor, APRN  11/08/22 0421

## 2022-11-25 PROCEDURE — 87086 URINE CULTURE/COLONY COUNT: CPT

## 2023-01-10 ENCOUNTER — OFFICE VISIT (OUTPATIENT)
Dept: FAMILY MEDICINE CLINIC | Facility: CLINIC | Age: 68
End: 2023-01-10
Payer: MEDICARE

## 2023-01-10 VITALS
TEMPERATURE: 97.8 F | HEIGHT: 64 IN | BODY MASS INDEX: 41.4 KG/M2 | OXYGEN SATURATION: 100 % | WEIGHT: 242.5 LBS | SYSTOLIC BLOOD PRESSURE: 118 MMHG | HEART RATE: 72 BPM | DIASTOLIC BLOOD PRESSURE: 78 MMHG

## 2023-01-10 DIAGNOSIS — E11.65 TYPE 2 DIABETES MELLITUS WITH HYPERGLYCEMIA, WITHOUT LONG-TERM CURRENT USE OF INSULIN: ICD-10-CM

## 2023-01-10 DIAGNOSIS — Z51.81 MEDICATION MONITORING ENCOUNTER: ICD-10-CM

## 2023-01-10 DIAGNOSIS — R31.9 HEMATURIA, UNSPECIFIED TYPE: ICD-10-CM

## 2023-01-10 DIAGNOSIS — N28.89 RIGHT RENAL MASS: ICD-10-CM

## 2023-01-10 DIAGNOSIS — R74.8 ELEVATED ALKALINE PHOSPHATASE LEVEL: ICD-10-CM

## 2023-01-10 DIAGNOSIS — I10 PRIMARY HYPERTENSION: Primary | ICD-10-CM

## 2023-01-10 DIAGNOSIS — E78.00 PURE HYPERCHOLESTEROLEMIA: ICD-10-CM

## 2023-01-10 DIAGNOSIS — M79.671 RIGHT FOOT PAIN: ICD-10-CM

## 2023-01-10 LAB
BACTERIA UR QL AUTO: ABNORMAL /HPF
BILIRUB UR QL STRIP: NEGATIVE
CLARITY UR: CLEAR
COLOR UR: YELLOW
GLUCOSE UR STRIP-MCNC: NEGATIVE MG/DL
HGB UR QL STRIP.AUTO: ABNORMAL
HYALINE CASTS UR QL AUTO: ABNORMAL /LPF
KETONES UR QL STRIP: NEGATIVE
LEUKOCYTE ESTERASE UR QL STRIP.AUTO: NEGATIVE
NITRITE UR QL STRIP: NEGATIVE
PH UR STRIP.AUTO: 7 [PH] (ref 5–8)
PROT UR QL STRIP: NEGATIVE
RBC # UR STRIP: ABNORMAL /HPF
REF LAB TEST METHOD: ABNORMAL
SP GR UR STRIP: 1.02 (ref 1–1.03)
SQUAMOUS #/AREA URNS HPF: ABNORMAL /HPF
UROBILINOGEN UR QL STRIP: ABNORMAL
WBC # UR STRIP: ABNORMAL /HPF

## 2023-01-10 PROCEDURE — 99214 OFFICE O/P EST MOD 30 MIN: CPT | Performed by: FAMILY MEDICINE

## 2023-01-10 PROCEDURE — 81001 URINALYSIS AUTO W/SCOPE: CPT | Performed by: FAMILY MEDICINE

## 2023-01-10 RX ORDER — LISINOPRIL 40 MG/1
40 TABLET ORAL EVERY 24 HOURS
Qty: 90 TABLET | Refills: 3 | Status: SHIPPED | OUTPATIENT
Start: 2023-01-10

## 2023-01-10 RX ORDER — COLCHICINE 0.6 MG/1
TABLET ORAL
Qty: 20 TABLET | Refills: 1 | Status: SHIPPED | OUTPATIENT
Start: 2023-01-10

## 2023-01-10 RX ORDER — HYDROCHLOROTHIAZIDE 25 MG/1
25 TABLET ORAL EVERY 24 HOURS
Qty: 90 TABLET | Refills: 3 | Status: SHIPPED | OUTPATIENT
Start: 2023-01-10

## 2023-01-10 RX ORDER — ATORVASTATIN CALCIUM 40 MG/1
40 TABLET, FILM COATED ORAL DAILY
Qty: 90 TABLET | Refills: 3 | Status: SHIPPED | OUTPATIENT
Start: 2023-01-10

## 2023-01-10 NOTE — PROGRESS NOTES
Chief Complaint  No chief complaint on file.    Subjective    {Problem List  Visit Diagnosis   Encounters  Notes  Medications  Labs  Result Review Imaging  Media :23}    Wilner Jacobson presents to [facility]  History of Present Illness    Objective   Vital Signs:  There were no vitals taken for this visit.  Estimated body mass index is 42.05 kg/m² as calculated from the following:    Height as of 11/8/22: 162.6 cm (64\").    Weight as of 11/25/22: 111 kg (245 lb).       {Class 3 Severe Obesity (BMI >=40). (Optional):02667}      Physical Exam   Result Review :{Labs  Result Review  Imaging  Med Tab  Media  Procedures  :23}  {The following data was reviewed by (Optional):72124}  {Ambulatory Labs (Optional):28897}  {Data reviewed (Optional):41101:::1}             Assessment and Plan {CC Problem List  Visit Diagnosis   ROS  Review (Popup)  Health Maintenance  Quality  BestPractice  Medications  SmartSets  SnapShot Encounters  Media :23}  There are no diagnoses linked to this encounter.       {Time Spent (Optional):29151}  Follow Up {Instructions Charge Capture  Follow-up Communications :23}  No follow-ups on file.  Patient was given instructions and counseling regarding her condition or for health maintenance advice. Please see specific information pulled into the AVS if appropriate.

## 2023-01-10 NOTE — PROGRESS NOTES
Chief Complaint  Hypertension  HLD    Subjective        Wilner Jacobson presents to [facility]  History of Present Illness  Patient presents today to follow-up for hypertension.  Blood pressure has been adequately controlled.  She is currently taking hydrochlorothiazide 25 mg daily as well as lisinopril 40 mg daily.  For hyperlipidemia she takes atorvastatin 40 mg.  She did see nephrology on 11/4/2022 as she did have acute kidney injury while taking Bactrim.  Her creatinine was 2.8 back on 10/24/2022.  The last time her creatinine was checked back on 10/31/2022 it was 1.01.  She will have follow-up again in 1 year for nephrology.  She does have noted hematuria which has been chronic.  There is a note for nephrology that she was previously seen by urology had a bladder biopsy.  I am not aware of where this was previously done although reviewing patient's history it looks like she had 2 cystoscopies done around 2013 with Dr. Bledsoe.  Her most recent urinalysis showed a large amount of blood present.  I reviewed records from [place].  She had a CT of the abdomen pelvis with and without contrast.  This showed per report that there was a lesion in the lower pole of the right kidney measuring 11 mm in diameter.  It was recommended to get an MRI examination of the abdomen without and with IV contrast for further evaluation.  This was ordered by Dr. Bledsoe.  The report shows that there was a 1 cm indeterminate mass in the lower pole the right kidney which is not consistent with a simple cyst and no enhancement was seen.  He was recommended at that time to have a follow-up in 6 months to 1 year.  Patient did see Dr. Bledsoe afterwards with this recommendation however looks like patient was lost to follow-up.  She thinks she may have had a biopsy at 1 point but I do not see any record of this so I did discuss with her proceeding with a repeat MRI of the abdomen since she was.  Imaging follow-up  despite this being a recommendation from 10 years ago.  She denies any more issues with right foot pain.  She did go to the emergency room after I saw her on 11/8/2022 showing subchondral lucency and mild cortical bone loss along the medial base of the right first proximal phalanx.  There was discussion per radiology that this may be related to subchondral cyst and degenerative change but osteomyelitis cannot be excluded.  They did discuss consideration for right foot MRI to further assess if clinically warranted.  I did discuss this with patient again today.  She is not having any pain in this area.  I did previously check a uric acid level that was elevated.  I suspect this may be secondary to gout.  I did discuss with her options and we discussed holding off on MRI evaluation at this time.  She does not report any fevers and again her symptoms have resolved.  We did discuss getting labs drawn to follow-up for her elevated alkaline phosphatase level which we have previously evaluated.  She did have a GGT level back in June that was normal.  She previously had a bone scan done back on 6/28/2022 showing per report multiple areas of abnormal uptake within the spine, shoulders, knees, feet and ankles patible with degenerative change.  I plan to monitor her labs at this time.  She is also due to have her A1c checked again.  Her last A1c was 6.00%.  She has previously been in the diabetic range but has worked on lifestyle changes including diet.  Objective   Vital Signs:  /78 (BP Location: Left arm, Patient Position: Sitting)   Pulse 72   Temp 97.8 °F (36.6 °C) (Oral)   Ht 162.6 cm (64\")   Wt 110 kg (242 lb 8 oz)   SpO2 100%   BMI 41.63 kg/m²   Estimated body mass index is 41.63 kg/m² as calculated from the following:    Height as of this encounter: 162.6 cm (64\").    Weight as of this encounter: 110 kg (242 lb 8 oz).             Physical Exam   General: AAO ×3, no acute distress, pleasant  HEENT:  Normocephalic, atraumatic, no discharge in the eyes, no discharge from the nose, no oropharyngeal erythema or exudates, and TMs intact bilaterally with no erythema, no cervical tenderness or lymphadenopathy  Musculoskeletal: No tenderness to palpation of the first metatarsophalangeal joint of the right foot.  She has good range of motion.  No joint deformity appreciated.  Cardiovascular: Regular rate and rhythm without appreciable murmur  Respiratory: Clear to auscultation bilaterally no RRW  Gastrointestinal: Soft nontender nondistended with bowel sounds present  extremities: No edema  Neurologic: CN II through XII grossly intact   Psychiatric: Normal mood and affect  Result Review :                   Assessment and Plan   Diagnoses and all orders for this visit:    1. Primary hypertension (Primary)  -     CBC & Differential  -     Comprehensive Metabolic Panel    2. Right foot pain    3. Hematuria, unspecified type  -     Urinalysis With Culture If Indicated - Urine, Clean Catch  -     MRI Abdomen With & Without Contrast; Future    4. Medication monitoring encounter  -     CBC & Differential  -     Comprehensive Metabolic Panel  -     Hemoglobin A1c  -     Lipid Panel    5. Elevated alkaline phosphatase level  -     Comprehensive Metabolic Panel  -     Gamma GT    6. Type 2 diabetes mellitus with hyperglycemia, without long-term current use of insulin (HCC)  -     Hemoglobin A1c    7. Pure hypercholesterolemia  -     Lipid Panel    8. Right renal mass  -     MRI Abdomen With & Without Contrast; Future    Other orders  -     atorvastatin (LIPITOR) 40 MG tablet; Take 1 tablet by mouth Daily.  Dispense: 90 tablet; Refill: 3  -     lisinopril (PRINIVIL,ZESTRIL) 40 MG tablet; Take 1 tablet by mouth Daily.  Dispense: 90 tablet; Refill: 3  -     hydroCHLOROthiazide (HYDRODIURIL) 25 MG tablet; Take 1 tablet by mouth Daily.  Dispense: 90 tablet; Refill: 3  -     colchicine 0.6 MG tablet; Take 2 PO once on first sign of  gout flare, Then take 1 PO daily afterwards until symptoms resolve  Dispense: 20 tablet; Refill: 1    I discussed with patient continuing current management for hypertension.  I did discuss with her the possibility that hydrochlorothiazide can contribute to gout flares.  We did discuss alternative options but she would like to hold off at this I will send in colchicine in case she does have a flareup of gout.  She is instructed to let me know should she have any further issues with her right foot.  As far as the right renal mass is concerned I did discuss with patient getting an MRI to follow-up from the previous MRI from 2013.  She thinks she may have had a biopsy at 1 point and we will check on this.  I do not see any record of this.  I plan to see her back in 3 months or sooner if needed.  Medications have been refilled as requested today.       I spent 35 minutes caring for Wilner on this date of service. This time includes time spent by me in the following activities:preparing for the visit, reviewing tests, obtaining and/or reviewing a separately obtained history, counseling and educating the patient/family/caregiver, ordering medications, tests, or procedures, documenting information in the medical record and care coordination  Follow Up   Return in about 3 months (around 4/10/2023) for hypertension/hematuria.  Patient was given instructions and counseling regarding her condition or for health maintenance advice. Please see specific information pulled into the AVS if appropriate.

## 2023-01-11 ENCOUNTER — LAB (OUTPATIENT)
Dept: FAMILY MEDICINE CLINIC | Facility: CLINIC | Age: 68
End: 2023-01-11
Payer: MEDICARE

## 2023-01-11 LAB
ALBUMIN SERPL-MCNC: 4.4 G/DL (ref 3.5–5.2)
ALBUMIN/GLOB SERPL: 1.5 G/DL
ALP SERPL-CCNC: 148 U/L (ref 39–117)
ALT SERPL W P-5'-P-CCNC: 17 U/L (ref 1–33)
ANION GAP SERPL CALCULATED.3IONS-SCNC: 10 MMOL/L (ref 5–15)
AST SERPL-CCNC: 23 U/L (ref 1–32)
BASOPHILS # BLD AUTO: 0.04 10*3/MM3 (ref 0–0.2)
BASOPHILS NFR BLD AUTO: 0.8 % (ref 0–1.5)
BILIRUB SERPL-MCNC: 0.4 MG/DL (ref 0–1.2)
BUN SERPL-MCNC: 15 MG/DL (ref 8–23)
BUN/CREAT SERPL: 19.2 (ref 7–25)
CALCIUM SPEC-SCNC: 9.3 MG/DL (ref 8.6–10.5)
CHLORIDE SERPL-SCNC: 98 MMOL/L (ref 98–107)
CHOLEST SERPL-MCNC: 172 MG/DL (ref 0–200)
CO2 SERPL-SCNC: 28 MMOL/L (ref 22–29)
CREAT SERPL-MCNC: 0.78 MG/DL (ref 0.57–1)
DEPRECATED RDW RBC AUTO: 42.5 FL (ref 37–54)
EGFRCR SERPLBLD CKD-EPI 2021: 83.4 ML/MIN/1.73
EOSINOPHIL # BLD AUTO: 0.05 10*3/MM3 (ref 0–0.4)
EOSINOPHIL NFR BLD AUTO: 1 % (ref 0.3–6.2)
ERYTHROCYTE [DISTWIDTH] IN BLOOD BY AUTOMATED COUNT: 13.4 % (ref 12.3–15.4)
GGT SERPL-CCNC: 22 U/L (ref 5–36)
GLOBULIN UR ELPH-MCNC: 2.9 GM/DL
GLUCOSE SERPL-MCNC: 87 MG/DL (ref 65–99)
HBA1C MFR BLD: 5.8 % (ref 4.8–5.6)
HCT VFR BLD AUTO: 38.2 % (ref 34–46.6)
HDLC SERPL-MCNC: 62 MG/DL (ref 40–60)
HGB BLD-MCNC: 12.2 G/DL (ref 12–15.9)
IMM GRANULOCYTES # BLD AUTO: 0.01 10*3/MM3 (ref 0–0.05)
IMM GRANULOCYTES NFR BLD AUTO: 0.2 % (ref 0–0.5)
LDLC SERPL CALC-MCNC: 92 MG/DL (ref 0–100)
LDLC/HDLC SERPL: 1.45 {RATIO}
LYMPHOCYTES # BLD AUTO: 1.46 10*3/MM3 (ref 0.7–3.1)
LYMPHOCYTES NFR BLD AUTO: 30.4 % (ref 19.6–45.3)
MCH RBC QN AUTO: 27.7 PG (ref 26.6–33)
MCHC RBC AUTO-ENTMCNC: 31.9 G/DL (ref 31.5–35.7)
MCV RBC AUTO: 86.8 FL (ref 79–97)
MONOCYTES # BLD AUTO: 0.41 10*3/MM3 (ref 0.1–0.9)
MONOCYTES NFR BLD AUTO: 8.5 % (ref 5–12)
NEUTROPHILS NFR BLD AUTO: 2.84 10*3/MM3 (ref 1.7–7)
NEUTROPHILS NFR BLD AUTO: 59.1 % (ref 42.7–76)
NRBC BLD AUTO-RTO: 0 /100 WBC (ref 0–0.2)
PLATELET # BLD AUTO: 291 10*3/MM3 (ref 140–450)
PMV BLD AUTO: 11.5 FL (ref 6–12)
POTASSIUM SERPL-SCNC: 3.7 MMOL/L (ref 3.5–5.2)
PROT SERPL-MCNC: 7.3 G/DL (ref 6–8.5)
RBC # BLD AUTO: 4.4 10*6/MM3 (ref 3.77–5.28)
SODIUM SERPL-SCNC: 136 MMOL/L (ref 136–145)
TRIGL SERPL-MCNC: 100 MG/DL (ref 0–150)
VLDLC SERPL-MCNC: 18 MG/DL (ref 5–40)
WBC NRBC COR # BLD: 4.81 10*3/MM3 (ref 3.4–10.8)

## 2023-01-11 PROCEDURE — 85025 COMPLETE CBC W/AUTO DIFF WBC: CPT | Performed by: FAMILY MEDICINE

## 2023-01-11 PROCEDURE — 80061 LIPID PANEL: CPT | Performed by: FAMILY MEDICINE

## 2023-01-11 PROCEDURE — 80053 COMPREHEN METABOLIC PANEL: CPT | Performed by: FAMILY MEDICINE

## 2023-01-11 PROCEDURE — 82977 ASSAY OF GGT: CPT | Performed by: FAMILY MEDICINE

## 2023-01-11 PROCEDURE — 83036 HEMOGLOBIN GLYCOSYLATED A1C: CPT | Performed by: FAMILY MEDICINE

## 2023-01-11 PROCEDURE — 36415 COLL VENOUS BLD VENIPUNCTURE: CPT | Performed by: FAMILY MEDICINE

## 2023-02-15 ENCOUNTER — HOSPITAL ENCOUNTER (OUTPATIENT)
Dept: MRI IMAGING | Facility: HOSPITAL | Age: 68
Discharge: HOME | End: 2023-02-15
Admitting: FAMILY MEDICINE
Payer: MEDICARE

## 2023-02-15 ENCOUNTER — PATIENT MESSAGE (OUTPATIENT)
Dept: FAMILY MEDICINE CLINIC | Facility: CLINIC | Age: 68
End: 2023-02-15
Payer: MEDICARE

## 2023-02-15 DIAGNOSIS — R31.9 HEMATURIA, UNSPECIFIED TYPE: ICD-10-CM

## 2023-02-15 DIAGNOSIS — N28.89 RIGHT RENAL MASS: ICD-10-CM

## 2023-02-15 LAB
CREAT BLDA-MCNC: 1 MG/DL
EGFRCR SERPLBLD CKD-EPI 2021: 61.9 ML/MIN/1.73

## 2023-02-15 PROCEDURE — 0 GADOBENATE DIMEGLUMINE 529 MG/ML SOLUTION: Performed by: FAMILY MEDICINE

## 2023-02-15 PROCEDURE — 82565 ASSAY OF CREATININE: CPT

## 2023-02-15 PROCEDURE — A9577 INJ MULTIHANCE: HCPCS | Performed by: FAMILY MEDICINE

## 2023-02-15 PROCEDURE — 74183 MRI ABD W/O CNTR FLWD CNTR: CPT

## 2023-02-15 RX ADMIN — GADOBENATE DIMEGLUMINE 20 ML: 529 INJECTION, SOLUTION INTRAVENOUS at 11:38

## 2023-02-16 DIAGNOSIS — R31.9 HEMATURIA, UNSPECIFIED TYPE: Primary | ICD-10-CM

## 2023-03-23 ENCOUNTER — HOSPITAL ENCOUNTER (OUTPATIENT)
Dept: CT IMAGING | Facility: HOSPITAL | Age: 68
Discharge: HOME | End: 2023-03-23
Admitting: FAMILY MEDICINE
Payer: MEDICARE

## 2023-03-23 DIAGNOSIS — R31.9 HEMATURIA, UNSPECIFIED TYPE: ICD-10-CM

## 2023-03-23 LAB
CREAT BLDA-MCNC: 1 MG/DL
EGFRCR SERPLBLD CKD-EPI 2021: 61.9 ML/MIN/1.73

## 2023-03-23 PROCEDURE — 74178 CT ABD&PLV WO CNTR FLWD CNTR: CPT

## 2023-03-23 PROCEDURE — 25510000001 IOPAMIDOL PER 1 ML: Performed by: FAMILY MEDICINE

## 2023-03-23 PROCEDURE — 82565 ASSAY OF CREATININE: CPT

## 2023-03-23 RX ADMIN — IOPAMIDOL 100 ML: 755 INJECTION, SOLUTION INTRAVENOUS at 14:26

## 2023-04-10 ENCOUNTER — TELEPHONE (OUTPATIENT)
Dept: FAMILY MEDICINE CLINIC | Facility: CLINIC | Age: 68
End: 2023-04-10

## 2023-04-10 NOTE — TELEPHONE ENCOUNTER
Caller: Wilner Jacobson    Relationship: Self    Best call back number: [phone]    What is the best time to reach you: ANY    Who are you requesting to speak with (clinical staff, provider,  specific staff member): CLINICAL    Do you know the name of the person who called: PATIENT    What was the call regarding: PATIENT WOULD LIKE TO BE ADVISED IF SHE WILL BE HAVING LABS DONE ON THE APPOINTMENT SCHEDULED FOR 4.17.2023.    PATIENT ALSO STATES SHE DID NOT RECEIVE A CALL TO DISCUSS RESULTS FROM SCAN DONE ON STOMACH AND PELVIC AREA.     Do you require a callback: YES

## 2023-04-11 NOTE — TELEPHONE ENCOUNTER
Phone call placed to patient to inform her that no labs are due before her appointment with voiced appreciation.

## 2023-04-17 ENCOUNTER — OFFICE VISIT (OUTPATIENT)
Dept: FAMILY MEDICINE CLINIC | Facility: CLINIC | Age: 68
End: 2023-04-17
Payer: MEDICARE

## 2023-04-17 VITALS
WEIGHT: 242.5 LBS | DIASTOLIC BLOOD PRESSURE: 74 MMHG | TEMPERATURE: 98.4 F | HEIGHT: 64 IN | SYSTOLIC BLOOD PRESSURE: 136 MMHG | HEART RATE: 74 BPM | OXYGEN SATURATION: 99 % | BODY MASS INDEX: 41.4 KG/M2

## 2023-04-17 DIAGNOSIS — E11.65 TYPE 2 DIABETES MELLITUS WITH HYPERGLYCEMIA, WITHOUT LONG-TERM CURRENT USE OF INSULIN: ICD-10-CM

## 2023-04-17 DIAGNOSIS — E78.00 PURE HYPERCHOLESTEROLEMIA: ICD-10-CM

## 2023-04-17 DIAGNOSIS — Z51.81 MEDICATION MONITORING ENCOUNTER: ICD-10-CM

## 2023-04-17 DIAGNOSIS — R31.9 HEMATURIA, UNSPECIFIED TYPE: ICD-10-CM

## 2023-04-17 DIAGNOSIS — I10 PRIMARY HYPERTENSION: ICD-10-CM

## 2023-04-17 DIAGNOSIS — R74.8 ELEVATED ALKALINE PHOSPHATASE LEVEL: ICD-10-CM

## 2023-04-17 DIAGNOSIS — K80.20 GALLSTONES: ICD-10-CM

## 2023-04-17 DIAGNOSIS — R22.32 AXILLARY MASS, LEFT: Primary | ICD-10-CM

## 2023-04-17 DIAGNOSIS — M10.9 GOUT, UNSPECIFIED CAUSE, UNSPECIFIED CHRONICITY, UNSPECIFIED SITE: ICD-10-CM

## 2023-04-17 DIAGNOSIS — K42.9 UMBILICAL HERNIA WITHOUT OBSTRUCTION AND WITHOUT GANGRENE: ICD-10-CM

## 2023-04-17 DIAGNOSIS — K76.0 FATTY LIVER: ICD-10-CM

## 2023-04-17 DIAGNOSIS — N28.1 RENAL CYST: ICD-10-CM

## 2023-04-17 NOTE — PROGRESS NOTES
Chief Complaint  Lump in left arm pit      Subjective        Wilner Jacobson presents to [facility]  History of Present Illness  Patient presents today to discuss a lump in her left armpit that has been present for at least 3 weeks when she for started noticing it.  It does not cause any pain or radiation of pain.  She reports she notices it in the morning and then does not notice it.  Her mammogram is up-to-date as of 11/8/2022.  This was BI-RADS 2-benign.  She denies any breast lump.  I did review her previous imaging.  She did have an MRI done of the abdomen which I ordered on 1/10/2023 to follow-up for hematuria.  She had previously seen urology and had a bladder biopsy.  She also had a lesion on the lower pole the right kidney measuring 11 mm diameter and it was recommended to have an MRI done to further evaluate.  This showed a 1 cm indeterminate mass in the lower pole the right kidney and it was recommended at that time to have a 6-month to 1 year follow-up.  She was lost to follow-up with Dr. Rakan goncalves in 2013.  MRI showed that there was lack of change compared to 2013 and a benign process was favored.  As far as the hematuria is concerned a CT of the abdomen pelvis was also done which showed no cause of patient's hematuria evident.  Cholelithiasis was noted however.  She denies any gallbladder pain or symptoms of biliary colic as I discussed and described with her.  She does have a small umbilical hernia which is not causing any symptoms for her.  Patient was noted to have a fatty liver on her CT of the abdomen pelvis from 3/23/2023.  Patient was previously noted to have an elevated alkaline phosphatase level.  GGT level was normal on 2 separate occasions.  She previously did have a bone scan on 6/28/2022.  She had multiple areas per report of abnormal uptake within the spine, shoulders, knees and feet and ankles compatible with degenerative changes.  I suspect the mild  "elevation is due to her fatty liver.  We discussed monitoring at this time.  She has had some issues with gout in her left foot.  She has used the colchicine.  Her last uric acid level was 6.7 back on 10/31/2022.  I did discuss with her adding allopurinol today but she would like to hold off.  Plan to reassess her labs again prior to her next appointment.  Blood pressure has been adequately controlled taking lisinopril 40 mg daily as well as hydrochlorothiazide 25 mg daily.  She is also taking atorvastatin 40 mg daily for hyperlipidemia.  Objective   Vital Signs:  /74   Pulse 74   Temp 98.4 °F (36.9 °C)   Ht 162.6 cm (64\")   Wt 110 kg (242 lb 8 oz)   SpO2 99%   BMI 41.63 kg/m²   Estimated body mass index is 41.63 kg/m² as calculated from the following:    Height as of this encounter: 162.6 cm (64\").    Weight as of this encounter: 110 kg (242 lb 8 oz).             Physical Exam   General: AAO ×3, no acute distress, pleasant  HEENT: Normocephalic, atraumatic  Cardiovascular: Regular rate and rhythm without appreciable murmur  Respiratory: Clear to auscultation bilaterally no RRW  Gastrointestinal: Soft nontender nondistended with bowel sounds present  Skin: No appreciable mass noted in the left axillary region.  Exam done in the presence of Snehal العلي MA  extremities: No edema  Neurologic: CN II through XII grossly intact   Psychiatric: Normal mood and affect  Result Review :                   Assessment and Plan   Diagnoses and all orders for this visit:    1. Axillary mass, left (Primary)  -     US Axilla Left; Future    2. Fatty liver    3. Gallstones    4. Hematuria, unspecified type    5. Umbilical hernia without obstruction and without gangrene    6. Renal cyst, right    7. Primary hypertension    8. Elevated alkaline phosphatase level    9. Type 2 diabetes mellitus with hyperglycemia, without long-term current use of insulin  -     Hemoglobin A1c; Future  -     CBC & Differential; " Future  -     Comprehensive Metabolic Panel; Future    10. Gout, unspecified cause, unspecified chronicity, unspecified site  -     Uric Acid; Future    11. Medication monitoring encounter  -     Uric Acid; Future  -     Hemoglobin A1c; Future  -     Lipid Panel; Future  -     CBC & Differential; Future  -     Comprehensive Metabolic Panel; Future    12. Pure hypercholesterolemia  -     Lipid Panel; Future      AspirinI am not able to appreciate any abnormality in the left axillary region.  I did discuss with her getting an ultrasound to further evaluate.  She is asymptomatic from her gallstones so I discussed with her monitoring at this time.  She has previously had a work-up for hematuria.  Her CT of the abdomen pelvis is reassuring.  I discussed with her no further evaluation at this time.  She has an umbilical hernia which she is asymptomatic.  I discussed with her monitoring at this time.  Her alkaline phosphatase level slightly elevated which I suspect is due to fatty liver.  We did discuss lifestyle modification.  Plan to have her labs repeated prior to next appointment.       Follow Up   Return in about 3 months (around 7/17/2023) for hypertension.  Patient was given instructions and counseling regarding her condition or for health maintenance advice. Please see specific information pulled into the AVS if appropriate.

## 2023-05-23 ENCOUNTER — HOSPITAL ENCOUNTER (OUTPATIENT)
Dept: ULTRASOUND IMAGING | Facility: HOSPITAL | Age: 68
Discharge: HOME | End: 2023-05-23
Admitting: FAMILY MEDICINE
Payer: MEDICARE

## 2023-05-23 DIAGNOSIS — R22.32 AXILLARY MASS, LEFT: ICD-10-CM

## 2023-05-23 PROCEDURE — 76882 US LMTD JT/FCL EVL NVASC XTR: CPT

## 2023-08-10 ENCOUNTER — TELEPHONE (OUTPATIENT)
Dept: FAMILY MEDICINE CLINIC | Facility: CLINIC | Age: 68
End: 2023-08-10
Payer: MEDICARE

## 2023-08-14 ENCOUNTER — OFFICE VISIT (OUTPATIENT)
Dept: FAMILY MEDICINE CLINIC | Facility: CLINIC | Age: 68
End: 2023-08-14
Payer: MEDICARE

## 2023-08-14 VITALS
TEMPERATURE: 97.7 F | DIASTOLIC BLOOD PRESSURE: 82 MMHG | OXYGEN SATURATION: 100 % | WEIGHT: 248.8 LBS | HEIGHT: 64 IN | HEART RATE: 86 BPM | SYSTOLIC BLOOD PRESSURE: 124 MMHG | BODY MASS INDEX: 42.47 KG/M2

## 2023-08-14 DIAGNOSIS — K59.00 CONSTIPATION, UNSPECIFIED CONSTIPATION TYPE: ICD-10-CM

## 2023-08-14 DIAGNOSIS — E78.00 PURE HYPERCHOLESTEROLEMIA: ICD-10-CM

## 2023-08-14 DIAGNOSIS — M10.9 GOUT, UNSPECIFIED CAUSE, UNSPECIFIED CHRONICITY, UNSPECIFIED SITE: ICD-10-CM

## 2023-08-14 DIAGNOSIS — I10 PRIMARY HYPERTENSION: Primary | ICD-10-CM

## 2023-08-14 DIAGNOSIS — E11.65 TYPE 2 DIABETES MELLITUS WITH HYPERGLYCEMIA, WITHOUT LONG-TERM CURRENT USE OF INSULIN: ICD-10-CM

## 2023-08-14 PROCEDURE — 3044F HG A1C LEVEL LT 7.0%: CPT | Performed by: FAMILY MEDICINE

## 2023-08-14 PROCEDURE — 99214 OFFICE O/P EST MOD 30 MIN: CPT | Performed by: FAMILY MEDICINE

## 2023-08-14 NOTE — PROGRESS NOTES
"Chief Complaint  Constipation      Subjective        Wilner Jacobson presents to [facility]  History of Present Illness  Patient presents today to follow-up for constipation.  Symptoms have improved since she is eating more raisins.  She is also taking lactulose sparingly.  Overall she is doing better.  Still periodically having some left lower quadrant pain.  She previously had a recent CT the abdomen pelvis done on 7/6/2023 showing no acute diverticulitis no appendicitis.  There was scattered colonic diverticula.  Stool studies were negative.  Blood pressure has been adequately controlled.  Patient is currently taking hydrochlorothiazide 25 mg daily as well as lisinopril 40 mg daily.  She is due for labs and will return fasting to have these done.  She continues to take atorvastatin for hyperlipidemia.  Objective   Vital Signs:  /82 (BP Location: Left arm, Patient Position: Sitting)   Pulse 86   Temp 97.7 øF (36.5 øC) (Oral)   Ht 162.6 cm (64\")   Wt 113 kg (248 lb 12.8 oz)   SpO2 100%   BMI 42.71 kg/mý   Estimated body mass index is 42.71 kg/mý as calculated from the following:    Height as of this encounter: 162.6 cm (64\").    Weight as of this encounter: 113 kg (248 lb 12.8 oz).             Physical Exam   General: AAO x3, no acute distress, pleasant  HEENT: Normocephalic, atraumatic  Cardiovascular: Regular rate and rhythm without appreciable murmur  Respiratory: Clear to auscultation bilaterally no RRW  Gastrointestinal: Soft nontender nondistended with bowel sounds present  extremities: No edema  Neurologic: CN II through XII grossly intact   Psychiatric: Normal mood and affect  Result Review :                   Assessment and Plan   Diagnoses and all orders for this visit:    1. Primary hypertension (Primary)    2. Constipation, unspecified constipation type    3. Gout, unspecified cause, unspecified chronicity, unspecified site    4. Type 2 diabetes mellitus with " hyperglycemia, without long-term current use of insulin  -     Microalbumin / Creatinine Urine Ratio - Urine, Clean Catch; Future    5. Pure hypercholesterolemia    I discussed with patient continuing current management for constipation as well as hypertension.  She is due to have labs done and will return fasting at her convenience to have these done.  She is encouraged to stay well-hydrated.  Should she have any further issues or concerns she is instructed to call or return.         Follow Up   Return if symptoms worsen or fail to improve.  Patient was given instructions and counseling regarding her condition or for health maintenance advice. Please see specific information pulled into the AVS if appropriate.

## 2023-08-15 ENCOUNTER — CLINICAL SUPPORT (OUTPATIENT)
Dept: FAMILY MEDICINE CLINIC | Facility: CLINIC | Age: 68
End: 2023-08-15
Payer: MEDICARE

## 2023-08-15 DIAGNOSIS — E11.65 TYPE 2 DIABETES MELLITUS WITH HYPERGLYCEMIA, WITHOUT LONG-TERM CURRENT USE OF INSULIN: ICD-10-CM

## 2023-08-15 DIAGNOSIS — E78.00 PURE HYPERCHOLESTEROLEMIA: ICD-10-CM

## 2023-08-15 DIAGNOSIS — M10.9 GOUT, UNSPECIFIED CAUSE, UNSPECIFIED CHRONICITY, UNSPECIFIED SITE: ICD-10-CM

## 2023-08-15 DIAGNOSIS — Z51.81 MEDICATION MONITORING ENCOUNTER: ICD-10-CM

## 2023-08-15 LAB
ALBUMIN UR-MCNC: <1.2 MG/DL
BASOPHILS # BLD AUTO: 0.03 10*3/MM3 (ref 0–0.2)
BASOPHILS NFR BLD AUTO: 0.6 % (ref 0–1.5)
CHOLEST SERPL-MCNC: 161 MG/DL (ref 0–200)
CREAT UR-MCNC: 28.4 MG/DL
DEPRECATED RDW RBC AUTO: 41.2 FL (ref 37–54)
EOSINOPHIL # BLD AUTO: 0.16 10*3/MM3 (ref 0–0.4)
EOSINOPHIL NFR BLD AUTO: 3.1 % (ref 0.3–6.2)
ERYTHROCYTE [DISTWIDTH] IN BLOOD BY AUTOMATED COUNT: 13.1 % (ref 12.3–15.4)
HBA1C MFR BLD: 6.3 % (ref 4.8–5.6)
HCT VFR BLD AUTO: 36 % (ref 34–46.6)
HDLC SERPL-MCNC: 56 MG/DL (ref 40–60)
HGB BLD-MCNC: 11.8 G/DL (ref 12–15.9)
IMM GRANULOCYTES # BLD AUTO: 0.01 10*3/MM3 (ref 0–0.05)
IMM GRANULOCYTES NFR BLD AUTO: 0.2 % (ref 0–0.5)
LDLC SERPL CALC-MCNC: 87 MG/DL (ref 0–100)
LDLC/HDLC SERPL: 1.51 {RATIO}
LYMPHOCYTES # BLD AUTO: 1.53 10*3/MM3 (ref 0.7–3.1)
LYMPHOCYTES NFR BLD AUTO: 29.4 % (ref 19.6–45.3)
MCH RBC QN AUTO: 28.7 PG (ref 26.6–33)
MCHC RBC AUTO-ENTMCNC: 32.8 G/DL (ref 31.5–35.7)
MCV RBC AUTO: 87.6 FL (ref 79–97)
MICROALBUMIN/CREAT UR: NORMAL MG/G{CREAT}
MONOCYTES # BLD AUTO: 0.48 10*3/MM3 (ref 0.1–0.9)
MONOCYTES NFR BLD AUTO: 9.2 % (ref 5–12)
NEUTROPHILS NFR BLD AUTO: 3 10*3/MM3 (ref 1.7–7)
NEUTROPHILS NFR BLD AUTO: 57.5 % (ref 42.7–76)
NRBC BLD AUTO-RTO: 0 /100 WBC (ref 0–0.2)
PLATELET # BLD AUTO: 265 10*3/MM3 (ref 140–450)
PMV BLD AUTO: 11.9 FL (ref 6–12)
RBC # BLD AUTO: 4.11 10*6/MM3 (ref 3.77–5.28)
TRIGL SERPL-MCNC: 101 MG/DL (ref 0–150)
URATE SERPL-MCNC: 7.5 MG/DL (ref 2.4–5.7)
VLDLC SERPL-MCNC: 18 MG/DL (ref 5–40)
WBC NRBC COR # BLD: 5.21 10*3/MM3 (ref 3.4–10.8)

## 2023-08-15 PROCEDURE — 85025 COMPLETE CBC W/AUTO DIFF WBC: CPT | Performed by: FAMILY MEDICINE

## 2023-08-15 PROCEDURE — 82043 UR ALBUMIN QUANTITATIVE: CPT | Performed by: FAMILY MEDICINE

## 2023-08-15 PROCEDURE — 82570 ASSAY OF URINE CREATININE: CPT | Performed by: FAMILY MEDICINE

## 2023-08-15 PROCEDURE — 83036 HEMOGLOBIN GLYCOSYLATED A1C: CPT | Performed by: FAMILY MEDICINE

## 2023-08-15 PROCEDURE — 80061 LIPID PANEL: CPT | Performed by: FAMILY MEDICINE

## 2023-08-15 PROCEDURE — 84550 ASSAY OF BLOOD/URIC ACID: CPT | Performed by: FAMILY MEDICINE

## 2023-08-15 PROCEDURE — 36415 COLL VENOUS BLD VENIPUNCTURE: CPT | Performed by: FAMILY MEDICINE

## 2023-08-16 ENCOUNTER — CLINICAL SUPPORT (OUTPATIENT)
Dept: FAMILY MEDICINE CLINIC | Facility: CLINIC | Age: 68
End: 2023-08-16
Payer: MEDICARE

## 2023-08-16 ENCOUNTER — TELEPHONE (OUTPATIENT)
Dept: FAMILY MEDICINE CLINIC | Facility: CLINIC | Age: 68
End: 2023-08-16

## 2023-08-16 DIAGNOSIS — E78.00 PURE HYPERCHOLESTEROLEMIA: ICD-10-CM

## 2023-08-16 DIAGNOSIS — Z51.81 MEDICATION MONITORING ENCOUNTER: ICD-10-CM

## 2023-08-16 DIAGNOSIS — E11.65 TYPE 2 DIABETES MELLITUS WITH HYPERGLYCEMIA, WITHOUT LONG-TERM CURRENT USE OF INSULIN: ICD-10-CM

## 2023-08-16 DIAGNOSIS — M10.9 GOUT, UNSPECIFIED CAUSE, UNSPECIFIED CHRONICITY, UNSPECIFIED SITE: ICD-10-CM

## 2023-08-16 LAB
ALBUMIN SERPL-MCNC: 4.1 G/DL (ref 3.5–5.2)
ALBUMIN/GLOB SERPL: 1.5 G/DL
ALP SERPL-CCNC: 149 U/L (ref 39–117)
ALT SERPL W P-5'-P-CCNC: 15 U/L (ref 1–33)
ANION GAP SERPL CALCULATED.3IONS-SCNC: 11.4 MMOL/L (ref 5–15)
AST SERPL-CCNC: 19 U/L (ref 1–32)
BILIRUB SERPL-MCNC: 0.4 MG/DL (ref 0–1.2)
BUN SERPL-MCNC: 18 MG/DL (ref 8–23)
BUN/CREAT SERPL: 19.6 (ref 7–25)
CALCIUM SPEC-SCNC: 9.6 MG/DL (ref 8.6–10.5)
CHLORIDE SERPL-SCNC: 101 MMOL/L (ref 98–107)
CO2 SERPL-SCNC: 27.6 MMOL/L (ref 22–29)
CREAT SERPL-MCNC: 0.92 MG/DL (ref 0.57–1)
EGFRCR SERPLBLD CKD-EPI 2021: 68.4 ML/MIN/1.73
GLOBULIN UR ELPH-MCNC: 2.7 GM/DL
GLUCOSE SERPL-MCNC: 91 MG/DL (ref 65–99)
POTASSIUM SERPL-SCNC: 4.1 MMOL/L (ref 3.5–5.2)
PROT SERPL-MCNC: 6.8 G/DL (ref 6–8.5)
SODIUM SERPL-SCNC: 140 MMOL/L (ref 136–145)

## 2023-08-16 PROCEDURE — 36415 COLL VENOUS BLD VENIPUNCTURE: CPT | Performed by: FAMILY MEDICINE

## 2023-08-16 PROCEDURE — 80053 COMPREHEN METABOLIC PANEL: CPT | Performed by: FAMILY MEDICINE

## 2023-11-01 DIAGNOSIS — N18.2 CHRONIC KIDNEY DISEASE, STAGE II (MILD): Primary | ICD-10-CM

## 2023-11-14 ENCOUNTER — CLINICAL SUPPORT (OUTPATIENT)
Dept: FAMILY MEDICINE CLINIC | Facility: CLINIC | Age: 68
End: 2023-11-14
Payer: MEDICARE

## 2023-11-14 DIAGNOSIS — N18.2 CHRONIC KIDNEY DISEASE, STAGE II (MILD): ICD-10-CM

## 2023-11-14 LAB
ALBUMIN SERPL-MCNC: 4.1 G/DL (ref 3.5–5.2)
ANION GAP SERPL CALCULATED.3IONS-SCNC: 13.5 MMOL/L (ref 5–15)
BACTERIA UR QL AUTO: NORMAL /HPF
BILIRUB UR QL STRIP: NEGATIVE
BUN SERPL-MCNC: 17 MG/DL (ref 8–23)
BUN/CREAT SERPL: 17.7 (ref 7–25)
CALCIUM SPEC-SCNC: 9.6 MG/DL (ref 8.6–10.5)
CHLORIDE SERPL-SCNC: 97 MMOL/L (ref 98–107)
CLARITY UR: CLEAR
CO2 SERPL-SCNC: 26.5 MMOL/L (ref 22–29)
COLOR UR: YELLOW
CREAT SERPL-MCNC: 0.96 MG/DL (ref 0.57–1)
CREAT UR-MCNC: 31.7 MG/DL
DEPRECATED RDW RBC AUTO: 39.9 FL (ref 37–54)
EGFRCR SERPLBLD CKD-EPI 2021: 64.6 ML/MIN/1.73
ERYTHROCYTE [DISTWIDTH] IN BLOOD BY AUTOMATED COUNT: 13.1 % (ref 12.3–15.4)
GLUCOSE SERPL-MCNC: 93 MG/DL (ref 65–99)
GLUCOSE UR STRIP-MCNC: NEGATIVE MG/DL
HCT VFR BLD AUTO: 36.3 % (ref 34–46.6)
HGB BLD-MCNC: 12.2 G/DL (ref 12–15.9)
HGB UR QL STRIP.AUTO: ABNORMAL
HYALINE CASTS UR QL AUTO: NORMAL /LPF
KETONES UR QL STRIP: NEGATIVE
LEUKOCYTE ESTERASE UR QL STRIP.AUTO: NEGATIVE
MCH RBC QN AUTO: 28.7 PG (ref 26.6–33)
MCHC RBC AUTO-ENTMCNC: 33.6 G/DL (ref 31.5–35.7)
MCV RBC AUTO: 85.4 FL (ref 79–97)
NITRITE UR QL STRIP: NEGATIVE
PH UR STRIP.AUTO: 6 [PH] (ref 5–8)
PHOSPHATE SERPL-MCNC: 3.3 MG/DL (ref 2.5–4.5)
PLATELET # BLD AUTO: 265 10*3/MM3 (ref 140–450)
PMV BLD AUTO: 11.6 FL (ref 6–12)
POTASSIUM SERPL-SCNC: 3.8 MMOL/L (ref 3.5–5.2)
PROT ?TM UR-MCNC: <4 MG/DL
PROT UR QL STRIP: NEGATIVE
PROT/CREAT UR: NORMAL MG/G{CREAT}
RBC # BLD AUTO: 4.25 10*6/MM3 (ref 3.77–5.28)
RBC # UR STRIP: NORMAL /HPF
REF LAB TEST METHOD: NORMAL
SODIUM SERPL-SCNC: 137 MMOL/L (ref 136–145)
SP GR UR STRIP: 1.01 (ref 1–1.03)
SQUAMOUS #/AREA URNS HPF: NORMAL /HPF
UROBILINOGEN UR QL STRIP: ABNORMAL
WBC # UR STRIP: NORMAL /HPF
WBC NRBC COR # BLD: 4.64 10*3/MM3 (ref 3.4–10.8)

## 2023-11-14 PROCEDURE — 84156 ASSAY OF PROTEIN URINE: CPT | Performed by: NURSE PRACTITIONER

## 2023-11-14 PROCEDURE — 82570 ASSAY OF URINE CREATININE: CPT | Performed by: NURSE PRACTITIONER

## 2023-11-14 PROCEDURE — 85027 COMPLETE CBC AUTOMATED: CPT | Performed by: NURSE PRACTITIONER

## 2023-11-14 PROCEDURE — 80069 RENAL FUNCTION PANEL: CPT | Performed by: NURSE PRACTITIONER

## 2023-11-14 PROCEDURE — 81001 URINALYSIS AUTO W/SCOPE: CPT | Performed by: NURSE PRACTITIONER

## 2023-11-14 PROCEDURE — 36415 COLL VENOUS BLD VENIPUNCTURE: CPT | Performed by: FAMILY MEDICINE

## 2024-01-10 ENCOUNTER — OFFICE VISIT (OUTPATIENT)
Dept: FAMILY MEDICINE CLINIC | Facility: CLINIC | Age: 69
End: 2024-01-10
Payer: MEDICARE

## 2024-01-10 VITALS
SYSTOLIC BLOOD PRESSURE: 128 MMHG | WEIGHT: 250.1 LBS | TEMPERATURE: 98 F | BODY MASS INDEX: 42.7 KG/M2 | HEART RATE: 74 BPM | DIASTOLIC BLOOD PRESSURE: 86 MMHG | OXYGEN SATURATION: 100 % | HEIGHT: 64 IN

## 2024-01-10 DIAGNOSIS — E11.65 TYPE 2 DIABETES MELLITUS WITH HYPERGLYCEMIA, WITHOUT LONG-TERM CURRENT USE OF INSULIN: ICD-10-CM

## 2024-01-10 DIAGNOSIS — N18.2 STAGE 2 CHRONIC KIDNEY DISEASE: ICD-10-CM

## 2024-01-10 DIAGNOSIS — Z78.0 POSTMENOPAUSAL: ICD-10-CM

## 2024-01-10 DIAGNOSIS — K59.00 CONSTIPATION, UNSPECIFIED CONSTIPATION TYPE: ICD-10-CM

## 2024-01-10 DIAGNOSIS — R76.8 CENTROMERE ANTIBODY POSITIVE: ICD-10-CM

## 2024-01-10 DIAGNOSIS — I10 PRIMARY HYPERTENSION: ICD-10-CM

## 2024-01-10 DIAGNOSIS — E78.00 PURE HYPERCHOLESTEROLEMIA: ICD-10-CM

## 2024-01-10 DIAGNOSIS — M10.9 GOUT, UNSPECIFIED CAUSE, UNSPECIFIED CHRONICITY, UNSPECIFIED SITE: ICD-10-CM

## 2024-01-10 DIAGNOSIS — Z00.00 MEDICARE ANNUAL WELLNESS VISIT, SUBSEQUENT: Primary | ICD-10-CM

## 2024-01-10 NOTE — PROGRESS NOTES
The ABCs of the Annual Wellness Visit  Subsequent Medicare Wellness Visit    Subjective    Wilner Jacobson is a 68 y.o. female who presents for a Subsequent Medicare Wellness Visit.    The following portions of the patient's history were reviewed and   updated as appropriate: allergies, current medications, past family history, past medical history, past social history, past surgical history, and problem list.    Compared to one year ago, the patient feels her physical   health is better.    Compared to one year ago, the patient feels her mental   health is better.    Recent Hospitalizations:  She was not admitted to the hospital during the last year.       Current Medical Providers:  Patient Care Team:  Abrahan Rick DO as PCP - General (Family Medicine)  Alcira Arroyo APRN as Nurse Practitioner (Nurse Practitioner)    Outpatient Medications Prior to Visit   Medication Sig Dispense Refill   • atorvastatin (LIPITOR) 40 MG tablet Take 1 tablet by mouth Daily. 90 tablet 3   • colchicine 0.6 MG tablet Take 2 PO once on first sign of gout flare, Then take 1 PO daily afterwards until symptoms resolve 20 tablet 1   • Diclofenac Sodium (VOLTAREN) 1 % gel gel Apply 4 g topically to the appropriate area as directed 4 (Four) Times a Day As Needed (AS NEEDED). 100 g 1   • dicyclomine (BENTYL) 20 MG tablet Take 1 tablet by mouth Every 6 (Six) Hours. 20 tablet 0   • hydroCHLOROthiazide (HYDRODIURIL) 25 MG tablet Take 1 tablet by mouth Daily. 90 tablet 3   • lactulose (CHRONULAC) 10 GM/15ML solution Take 30 mL by mouth 2 (Two) Times a Day As Needed (constipation). 473 mL 0   • lisinopril (PRINIVIL,ZESTRIL) 40 MG tablet Take 1 tablet by mouth Daily. 90 tablet 3     No facility-administered medications prior to visit.       No opioid medication identified on active medication list. I have reviewed chart for other potential  high risk medication/s and harmful drug interactions in the elderly.        Aspirin is not on active  "medication list.  Aspirin use is not indicated based on review of current medical condition/s. Risk of harm outweighs potential benefits.  .    Patient Active Problem List   Diagnosis   • Screening for malignant neoplasm of colon     Advance Care Planning   Advance Care Planning     Advance Directive is not on file.  ACP discussion was held with the patient during this visit. Patient does not have an advance directive, declines further assistance.     Objective    Vitals:    01/10/24 1037   BP: 128/86   Pulse: 74   Temp: 98 °F (36.7 °C)   SpO2: 100%   Weight: 113 kg (250 lb 1.6 oz)   Height: 162.6 cm (64\")   PainSc: 0-No pain     Estimated body mass index is 42.93 kg/m² as calculated from the following:    Height as of this encounter: 162.6 cm (64\").    Weight as of this encounter: 113 kg (250 lb 1.6 oz).           Does the patient have evidence of cognitive impairment? No          HEALTH RISK ASSESSMENT    Smoking Status:  Social History     Tobacco Use   Smoking Status Never   Smokeless Tobacco Never     Alcohol Consumption:  Social History     Substance and Sexual Activity   Alcohol Use Yes   • Alcohol/week: 2.0 standard drinks of alcohol   • Types: 2 Glasses of wine per week    Comment: occasionally drinks, has been drinking for 31 or more years      Fall Risk Screen:    CAREN Fall Risk Assessment was completed, and patient is at LOW risk for falls.Assessment completed on:1/10/2024    Depression Screenin/10/2024    10:34 AM   PHQ-2/PHQ-9 Depression Screening   Little Interest or Pleasure in Doing Things 0-->not at all   Feeling Down, Depressed or Hopeless 0-->not at all   PHQ-9: Brief Depression Severity Measure Score 0       Health Habits and Functional and Cognitive Screenin/10/2024    10:34 AM   Functional & Cognitive Status   Do you have difficulty preparing food and eating? No   Do you have difficulty bathing yourself, getting dressed or grooming yourself? No   Do you have difficulty " using the toilet? No   Do you have difficulty moving around from place to place? No   Do you have trouble with steps or getting out of a bed or a chair? No   Current Diet Well Balanced Diet   Dental Exam Up to date   Eye Exam Up to date   Exercise (times per week) 0 times per week   Current Exercises Include No Regular Exercise   Do you need help using the phone?  No   Are you deaf or do you have serious difficulty hearing?  No   Do you need help to go to places out of walking distance? No   Do you need help shopping? No   Do you need help preparing meals?  No   Do you need help with housework?  No   Do you need help with laundry? No   Do you need help taking your medications? No   Do you need help managing money? No   Do you ever drive or ride in a car without wearing a seat belt? No   Have you felt unusual stress, anger or loneliness in the last month? No   Who do you live with? Spouse   If you need help, do you have trouble finding someone available to you? No   Have you been bothered in the last four weeks by sexual problems? No   Do you have difficulty concentrating, remembering or making decisions? No       Age-appropriate Screening Schedule:  Refer to the list below for future screening recommendations based on patient's age, sex and/or medical conditions. Orders for these recommended tests are listed in the plan section. The patient has been provided with a written plan.    Health Maintenance   Topic Date Due   • ZOSTER VACCINE (1 of 2) Never done   • DIABETIC FOOT EXAM  Never done   • DIABETIC EYE EXAM  Never done   • DXA SCAN  07/16/2022   • COVID-19 Vaccine (4 - 2023-24 season) 09/01/2023   • INFLUENZA VACCINE  03/31/2024 (Originally 8/1/2023)   • Pneumococcal Vaccine 65+ (1 of 2 - PCV) 01/10/2025 (Originally 9/19/1961)   • TDAP/TD VACCINES (1 - Tdap) 01/10/2025 (Originally 9[birth year]19/1974)   • HEMOGLOBIN A1C  02/15/2024   • BMI FOLLOWUP  04/17/2024   • LIPID PANEL  08/15/2024   • MAMMOGRAM  11/08/2024   •  URINE MICROALBUMIN  11/14/2024   • ANNUAL WELLNESS VISIT  01/10/2025   • COLORECTAL CANCER SCREENING  08/30/2027   • HEPATITIS C SCREENING  Completed                  CMS Preventative Services Quick Reference  Risk Factors Identified During Encounter  Polypharmacy: Medication List reviewed  The above risks/problems have been discussed with the patient.  Pertinent information has been shared with the patient in the After Visit Summary.  An After Visit Summary and PPPS were made available to the patient.    Follow Up:   Next Medicare Wellness visit to be scheduled in 1 year.       Additional E&M Note during same encounter follows:  Patient has multiple medical problems which are significant and separately identifiable that require additional work above and beyond the Medicare Wellness Visit.      Chief Complaint  Medicare Wellness-subsequent    Subjective        HPI  Patient presents today for Medicare annual wellness visit.  She reports overall doing well at this time.  No major issues or concerns to discuss today.  I did review her most recent nephrology note.  That they had discussed at that time that she had a positive anticentromere antibody was noted.  She was supposed to see rheumatology couple years ago with Dr. Nevarez.  I will request records.  She is unsure if she was seen or not in this regard.  She continues to be seen for chronic kidney disease stage II.  Blood pressure has been adequately controlled taking lisinopril 40 mg daily as well as hydrochlorothiazide 25 mg daily.  She does periodically have some issues with gout and uses colchicine on as-needed basis.  Her last uric acid level was 7.5.  We did discuss options today including adding allopurinol but she would like to hold off at this time until labs are completed.  We did discuss a low purine diet.  She does continue to take atorvastatin 40 mg daily for hyperlipidemia.  Her last lipid panel showed her LDL at 87.  She is due for DEXA scan.  Her  "last DEXA scan in 2020 was normal.  We did discuss taking calcium and vitamin D.  Constipation symptoms have been stable.  She is not using lactulose on a regular basis.  Her last A1c was 6.3%.  She has been controlled for type 2 diabetes.  She is not currently taking any medication for diabetes.         Objective   Vital Signs:  /86   Pulse 74   Temp 98 °F (36.7 °C)   Ht 162.6 cm (64\")   Wt 113 kg (250 lb 1.6 oz)   SpO2 100%   BMI 42.93 kg/m²     Physical Exam  Vitals reviewed.   Constitutional:       Appearance: Normal appearance.   HENT:      Head: Normocephalic and atraumatic.      Right Ear: External ear normal.      Left Ear: External ear normal.      Nose: Nose normal.   Eyes:      Conjunctiva/sclera: Conjunctivae normal.   Cardiovascular:      Rate and Rhythm: Normal rate and regular rhythm.      Heart sounds: No murmur heard.     No friction rub. No gallop.   Pulmonary:      Effort: Pulmonary effort is normal.      Breath sounds: Normal breath sounds. No wheezing or rhonchi.   Abdominal:      General: Bowel sounds are normal. There is no distension.      Palpations: Abdomen is soft.      Tenderness: There is no abdominal tenderness.   Skin:     General: Skin is warm and dry.   Neurological:      Mental Status: She is alert and oriented to person, place, and time.      Cranial Nerves: No cranial nerve deficit.   Psychiatric:         Mood and Affect: Mood and affect normal.         Behavior: Behavior normal.         Thought Content: Thought content normal.         Judgment: Judgment normal.                       Assessment and Plan   Diagnoses and all orders for this visit:    1. Medicare annual wellness visit, subsequent (Primary)    2. Postmenopausal  -     DEXA Bone Density Axial; Future    3. Centromere antibody positive    4. Stage 2 chronic kidney disease    5. Primary hypertension  -     CBC & Differential; Future  -     Comprehensive Metabolic Panel; Future    6. Constipation, unspecified " constipation type    7. Gout, unspecified cause, unspecified chronicity, unspecified site  -     Uric Acid; Future    8. Type 2 diabetes mellitus with hyperglycemia, without long-term current use of insulin  -     Hemoglobin A1c; Future    9. Pure hypercholesterolemia  -     Lipid Panel; Future    Other orders  -     Calcium Carbonate-Vitamin D 600-10 MG-MCG per tablet; Take 2 tablets by mouth Daily.  Dispense: 180 tablet; Refill: 3    Plan as documented above.  We discussed requesting records from rheumatology.  Plan to continue current management for hypertension, diabetes, hyperlipidemia and gout.  I will see her back in 3 months or sooner if needed.  Patient instructed to call with any questions or concerns.         Follow Up   Return in about 3 months (around 4/10/2024) for hypertension.  Patient was given instructions and counseling regarding her condition or for health maintenance advice. Please see specific information pulled into the AVS if appropriate.

## 2024-01-30 DIAGNOSIS — Z12.39 ENCOUNTER FOR SCREENING FOR MALIGNANT NEOPLASM OF BREAST, UNSPECIFIED SCREENING MODALITY: Primary | ICD-10-CM

## 2024-01-30 DIAGNOSIS — Z12.31 ENCOUNTER FOR SCREENING MAMMOGRAM FOR MALIGNANT NEOPLASM OF BREAST: ICD-10-CM

## 2024-02-13 ENCOUNTER — HOSPITAL ENCOUNTER (OUTPATIENT)
Dept: BONE DENSITY | Facility: HOSPITAL | Age: 69
Discharge: HOME | End: 2024-02-13
Admitting: FAMILY MEDICINE
Payer: MEDICARE

## 2024-02-13 DIAGNOSIS — Z78.0 POSTMENOPAUSAL: ICD-10-CM

## 2024-02-13 PROCEDURE — 77080 DXA BONE DENSITY AXIAL: CPT

## 2024-04-09 ENCOUNTER — TELEPHONE (OUTPATIENT)
Dept: FAMILY MEDICINE CLINIC | Facility: CLINIC | Age: 69
End: 2024-04-09

## 2024-04-11 ENCOUNTER — HOSPITAL ENCOUNTER (OUTPATIENT)
Dept: MAMMOGRAPHY | Facility: HOSPITAL | Age: 69
Discharge: HOME | End: 2024-04-11
Admitting: FAMILY MEDICINE
Payer: MEDICARE

## 2024-04-11 DIAGNOSIS — Z12.31 ENCOUNTER FOR SCREENING MAMMOGRAM FOR MALIGNANT NEOPLASM OF BREAST: ICD-10-CM

## 2024-04-11 DIAGNOSIS — Z12.39 ENCOUNTER FOR SCREENING FOR MALIGNANT NEOPLASM OF BREAST, UNSPECIFIED SCREENING MODALITY: ICD-10-CM

## 2024-04-11 PROCEDURE — 77063 BREAST TOMOSYNTHESIS BI: CPT

## 2024-04-11 PROCEDURE — 77067 SCR MAMMO BI INCL CAD: CPT

## 2024-04-22 RX ORDER — LACTULOSE 10 G/15ML
20 SOLUTION ORAL 2 TIMES DAILY PRN
Qty: 473 ML | Refills: 0 | Status: SHIPPED | OUTPATIENT
Start: 2024-04-22

## 2024-04-22 RX ORDER — ATORVASTATIN CALCIUM 40 MG/1
40 TABLET, FILM COATED ORAL DAILY
Qty: 90 TABLET | Refills: 3 | Status: SHIPPED | OUTPATIENT
Start: 2024-04-22

## 2024-04-22 RX ORDER — LISINOPRIL 40 MG/1
40 TABLET ORAL EVERY 24 HOURS
Qty: 90 TABLET | Refills: 3 | Status: SHIPPED | OUTPATIENT
Start: 2024-04-22

## 2024-04-22 NOTE — TELEPHONE ENCOUNTER
Caller: Wilner Jacobson    Relationship: Self    Best call back number: [phone]    Requested Prescriptions:   Requested Prescriptions     Pending Prescriptions Disp Refills    atorvastatin (LIPITOR) 40 MG tablet 90 tablet 3     Sig: Take 1 tablet by mouth Daily.    lisinopril (PRINIVIL,ZESTRIL) 40 MG tablet 90 tablet 3     Sig: Take 1 tablet by mouth Daily.    lactulose (CHRONULAC) 10 GM/15ML solution 473 mL 0     Sig: Take 30 mL by mouth 2 (Two) Times a Day As Needed (constipation).        Pharmacy where request should be sent: [facility], [address] [phone] [facility] [phone] FX     Last office visit with prescribing clinician: 1/10/2024   Last telemedicine visit with prescribing clinician: Visit date not found   Next office visit with prescribing clinician: 5/29/2024     Additional details provided by patient:     Does the patient have less than a 3 day supply:  [] Yes  [x] No    Would you like a call back once the refill request has been completed: [x] Yes [] No    If the office needs to give you a call back, can they leave a voicemail: [x] Yes [] No    Janet Méndez   04/22/24 11:18 EDT

## 2024-05-24 ENCOUNTER — CLINICAL SUPPORT (OUTPATIENT)
Dept: FAMILY MEDICINE CLINIC | Facility: CLINIC | Age: 69
End: 2024-05-24
Payer: MEDICARE

## 2024-05-24 DIAGNOSIS — E11.65 TYPE 2 DIABETES MELLITUS WITH HYPERGLYCEMIA, WITHOUT LONG-TERM CURRENT USE OF INSULIN: ICD-10-CM

## 2024-05-24 DIAGNOSIS — M10.9 GOUT, UNSPECIFIED CAUSE, UNSPECIFIED CHRONICITY, UNSPECIFIED SITE: ICD-10-CM

## 2024-05-24 DIAGNOSIS — I10 PRIMARY HYPERTENSION: ICD-10-CM

## 2024-05-24 DIAGNOSIS — E78.00 PURE HYPERCHOLESTEROLEMIA: ICD-10-CM

## 2024-05-24 LAB
ALBUMIN SERPL-MCNC: 4.1 G/DL (ref 3.5–5.2)
ALBUMIN/GLOB SERPL: 1.3 G/DL
ALP SERPL-CCNC: 155 U/L (ref 39–117)
ALT SERPL W P-5'-P-CCNC: 15 U/L (ref 1–33)
ANION GAP SERPL CALCULATED.3IONS-SCNC: 13.2 MMOL/L (ref 5–15)
AST SERPL-CCNC: 17 U/L (ref 1–32)
BASOPHILS # BLD AUTO: 0.03 10*3/MM3 (ref 0–0.2)
BASOPHILS NFR BLD AUTO: 0.6 % (ref 0–1.5)
BILIRUB SERPL-MCNC: 0.5 MG/DL (ref 0–1.2)
BUN SERPL-MCNC: 16 MG/DL (ref 8–23)
BUN/CREAT SERPL: 16.5 (ref 7–25)
CALCIUM SPEC-SCNC: 9.8 MG/DL (ref 8.6–10.5)
CHLORIDE SERPL-SCNC: 100 MMOL/L (ref 98–107)
CHOLEST SERPL-MCNC: 169 MG/DL (ref 0–200)
CO2 SERPL-SCNC: 25.8 MMOL/L (ref 22–29)
CREAT SERPL-MCNC: 0.97 MG/DL (ref 0.57–1)
DEPRECATED RDW RBC AUTO: 40.9 FL (ref 37–54)
EGFRCR SERPLBLD CKD-EPI 2021: 63.8 ML/MIN/1.73
EOSINOPHIL # BLD AUTO: 0.1 10*3/MM3 (ref 0–0.4)
EOSINOPHIL NFR BLD AUTO: 2.2 % (ref 0.3–6.2)
ERYTHROCYTE [DISTWIDTH] IN BLOOD BY AUTOMATED COUNT: 13.1 % (ref 12.3–15.4)
GLOBULIN UR ELPH-MCNC: 3.1 GM/DL
GLUCOSE SERPL-MCNC: 99 MG/DL (ref 65–99)
HBA1C MFR BLD: 6.1 % (ref 4.8–5.6)
HCT VFR BLD AUTO: 38.6 % (ref 34–46.6)
HDLC SERPL-MCNC: 51 MG/DL (ref 40–60)
HGB BLD-MCNC: 12.4 G/DL (ref 12–15.9)
IMM GRANULOCYTES # BLD AUTO: 0.02 10*3/MM3 (ref 0–0.05)
IMM GRANULOCYTES NFR BLD AUTO: 0.4 % (ref 0–0.5)
LDLC SERPL CALC-MCNC: 96 MG/DL (ref 0–100)
LDLC/HDLC SERPL: 1.83 {RATIO}
LYMPHOCYTES # BLD AUTO: 1.33 10*3/MM3 (ref 0.7–3.1)
LYMPHOCYTES NFR BLD AUTO: 28.7 % (ref 19.6–45.3)
MCH RBC QN AUTO: 27.8 PG (ref 26.6–33)
MCHC RBC AUTO-ENTMCNC: 32.1 G/DL (ref 31.5–35.7)
MCV RBC AUTO: 86.5 FL (ref 79–97)
MONOCYTES # BLD AUTO: 0.47 10*3/MM3 (ref 0.1–0.9)
MONOCYTES NFR BLD AUTO: 10.2 % (ref 5–12)
NEUTROPHILS NFR BLD AUTO: 2.68 10*3/MM3 (ref 1.7–7)
NEUTROPHILS NFR BLD AUTO: 57.9 % (ref 42.7–76)
NRBC BLD AUTO-RTO: 0 /100 WBC (ref 0–0.2)
PLATELET # BLD AUTO: 278 10*3/MM3 (ref 140–450)
PMV BLD AUTO: 11.8 FL (ref 6–12)
POTASSIUM SERPL-SCNC: 3.9 MMOL/L (ref 3.5–5.2)
PROT SERPL-MCNC: 7.2 G/DL (ref 6–8.5)
RBC # BLD AUTO: 4.46 10*6/MM3 (ref 3.77–5.28)
SODIUM SERPL-SCNC: 139 MMOL/L (ref 136–145)
TRIGL SERPL-MCNC: 124 MG/DL (ref 0–150)
URATE SERPL-MCNC: 8.4 MG/DL (ref 2.4–5.7)
VLDLC SERPL-MCNC: 22 MG/DL (ref 5–40)
WBC NRBC COR # BLD AUTO: 4.63 10*3/MM3 (ref 3.4–10.8)

## 2024-05-24 PROCEDURE — 84550 ASSAY OF BLOOD/URIC ACID: CPT | Performed by: FAMILY MEDICINE

## 2024-05-24 PROCEDURE — 80061 LIPID PANEL: CPT | Performed by: FAMILY MEDICINE

## 2024-05-24 PROCEDURE — 83036 HEMOGLOBIN GLYCOSYLATED A1C: CPT | Performed by: FAMILY MEDICINE

## 2024-05-24 PROCEDURE — 80053 COMPREHEN METABOLIC PANEL: CPT | Performed by: FAMILY MEDICINE

## 2024-05-24 PROCEDURE — 36415 COLL VENOUS BLD VENIPUNCTURE: CPT | Performed by: FAMILY MEDICINE

## 2024-05-24 PROCEDURE — 85025 COMPLETE CBC W/AUTO DIFF WBC: CPT | Performed by: FAMILY MEDICINE

## 2024-05-29 ENCOUNTER — OFFICE VISIT (OUTPATIENT)
Dept: FAMILY MEDICINE CLINIC | Facility: CLINIC | Age: 69
End: 2024-05-29
Payer: MEDICARE

## 2024-05-29 VITALS
OXYGEN SATURATION: 98 % | BODY MASS INDEX: 42.76 KG/M2 | TEMPERATURE: 97.9 F | HEIGHT: 64 IN | DIASTOLIC BLOOD PRESSURE: 82 MMHG | HEART RATE: 80 BPM | WEIGHT: 250.5 LBS | SYSTOLIC BLOOD PRESSURE: 118 MMHG

## 2024-05-29 DIAGNOSIS — R22.32 AXILLARY LUMP, LEFT: ICD-10-CM

## 2024-05-29 DIAGNOSIS — K76.0 FATTY LIVER: ICD-10-CM

## 2024-05-29 DIAGNOSIS — K59.00 CONSTIPATION, UNSPECIFIED CONSTIPATION TYPE: ICD-10-CM

## 2024-05-29 DIAGNOSIS — R73.03 PREDIABETES: ICD-10-CM

## 2024-05-29 DIAGNOSIS — E78.00 PURE HYPERCHOLESTEROLEMIA: ICD-10-CM

## 2024-05-29 DIAGNOSIS — I10 PRIMARY HYPERTENSION: Primary | ICD-10-CM

## 2024-05-29 DIAGNOSIS — Z51.81 MEDICATION MONITORING ENCOUNTER: ICD-10-CM

## 2024-05-29 DIAGNOSIS — M10.9 GOUT, UNSPECIFIED CAUSE, UNSPECIFIED CHRONICITY, UNSPECIFIED SITE: ICD-10-CM

## 2024-05-29 PROCEDURE — 3044F HG A1C LEVEL LT 7.0%: CPT | Performed by: FAMILY MEDICINE

## 2024-05-29 PROCEDURE — 1126F AMNT PAIN NOTED NONE PRSNT: CPT | Performed by: FAMILY MEDICINE

## 2024-05-29 PROCEDURE — 99214 OFFICE O/P EST MOD 30 MIN: CPT | Performed by: FAMILY MEDICINE

## 2024-05-29 RX ORDER — HYDROCHLOROTHIAZIDE 25 MG/1
25 TABLET ORAL EVERY 24 HOURS
Qty: 90 TABLET | Refills: 3 | Status: SHIPPED | OUTPATIENT
Start: 2024-05-29

## 2024-05-29 NOTE — PROGRESS NOTES
Chief Complaint  Cough  Axillary lump, left  HTN/HLD    Subjective          Wilner Jacobson presents to [facility]  History of Present Illness  Patient presents today to follow-up for hypertension and hyperlipidemia.  She had labs done prior to the appointment.  We reviewed these.  Uric acid level was at 8.4.  She has not had any recent flareups of gout.  She does take colchicine only as needed and is not interested in adding allopurinol at this point.  We did discuss a low purine diet.  Lipid panel shows the LDL at 96.  She continues to take atorvastatin 40 mg daily.  We did discuss continuing current management.  A1c is 6.1%.  She has continued to be in the prediabetic range.  She is not currently taking any medication for glucose control.  We discussed continue to work on cutting back on carbohydrates/sugars.  Her kidney function is normal.  Her alkaline phosphatase level is slightly elevated but stable at 155.  She previously had a negative workup for elevated alkaline phosphatase she previously had a bone scan done back on 6/28/2022 showing multiple areas of abnormal uptake within the spine, shoulders, knees, feet, and ankles compatible with degenerative changes.  We discussed continue to monitor with routine labs at this time.  Constipation has been under control taking lactulose on an as-needed basis.  We have previously discussed a sensation of an axillary lump on the left side.  This was worked up last year.  Left ultrasound of the axilla showed a negative exam with a few benign lymph nodes with no evidence of a mass or abnormal fluid collection.  She just recently had a mammogram done that was BI-RADS 1-negative.  I am unable to palpate this on exam on this visit and was not able to palpate it previously.  She is not able to find the lump either today.  I did discuss with her an option in the future to get a CT of the chest completed to reevaluate.  I did discuss with her  "that the findings seen on the ultrasound were reassuring findings as she does have lymph nodes present but these have a benign appearance.  I did also offer to repeat the ultrasound but she would like to hold off at this time.  If she has any further issues I did discuss with her letting us know and I will order CT of the chest for further evaluation.  Patient reports that she has had a cough periodically.  This occurs at nighttime.  It is a dry cough.  She has been on lisinopril for several years and does not feel that this is what is causing it.  Blood pressure has been adequately controlled.  I did offer further evaluation today getting a chest x-ray but she would like to hold off.    Current Outpatient Medications   Medication Instructions   • atorvastatin (LIPITOR) 40 mg, Oral, Daily   • Calcium Carbonate-Vitamin D 600-10 MG-MCG per tablet 2 tablets, Oral, Daily   • colchicine 0.6 MG tablet Take 2 PO once on first sign of gout flare, Then take 1 PO daily afterwards until symptoms resolve   • Diclofenac Sodium (VOLTAREN) 4 g, Topical, 4 Times Daily PRN   • dicyclomine (BENTYL) 20 mg, Oral, Every 6 Hours   • hydroCHLOROthiazide 25 mg, Oral, Every 24 Hours   • lactulose (CHRONULAC) 20 g, Oral, 2 Times Daily PRN   • lisinopril (PRINIVIL,ZESTRIL) 40 mg, Oral, Every 24 Hours       The following portions of the patient's history were reviewed and updated as appropriate: allergies, current medications, past family history, past medical history, past social history, past surgical history, and problem list.    Objective   Vital Signs:   /82 (BP Location: Left arm, Patient Position: Sitting)   Pulse 80   Temp 97.9 °F (36.6 °C) (Oral)   Ht 162.6 cm (64\")   Wt 114 kg (250 lb 8 oz)   SpO2 98%   BMI 43.00 kg/m²     BP Readings from Last 3 Encounters:   05/29/24 118/82   01/10/24 128/86   08/14/23 124/82     Wt Readings from Last 3 Encounters:   05/29/24 114 kg (250 lb 8 oz)   01/10/24 113 kg (250 lb 1.6 oz) "   08/14/23 113 kg (248 lb 12.8 oz)     Class 3 Severe Obesity (BMI >=40). Obesity-related health conditions include the following: hypertension. Obesity is unchanged. BMI is is above average; BMI management plan is completed. We discussed portion control and increasing exercise.     Physical Exam  Vitals reviewed.   Constitutional:       Appearance: Normal appearance.   HENT:      Head: Normocephalic and atraumatic.      Right Ear: External ear normal.      Left Ear: External ear normal.      Nose: Nose normal.   Eyes:      Conjunctiva/sclera: Conjunctivae normal.   Cardiovascular:      Rate and Rhythm: Normal rate and regular rhythm.      Heart sounds: No murmur heard.     No friction rub. No gallop.   Pulmonary:      Effort: Pulmonary effort is normal.      Breath sounds: Normal breath sounds. No wheezing or rhonchi.   Abdominal:      General: Bowel sounds are normal. There is no distension.      Palpations: Abdomen is soft.      Tenderness: There is no abdominal tenderness.   Lymphadenopathy:      Comments: No appreciable axillary mass on the left.   Skin:     General: Skin is warm and dry.      Comments: No skin abnormality seen in the left axilla.   Neurological:      Mental Status: She is alert and oriented to person, place, and time.      Cranial Nerves: No cranial nerve deficit.   Psychiatric:         Mood and Affect: Mood and affect normal.         Behavior: Behavior normal.         Thought Content: Thought content normal.         Judgment: Judgment normal.          Result Review :   The following data was reviewed by: Abrahan Rick DO on 05/29/2024:  Common labs          8/16/2023    12:13 11/14/2023    17:06 5/24/2024    12:53   Common Labs   Glucose 91  93  99    BUN 18  17  16    Creatinine 0.92  0.96  0.97    Sodium 140  137  139    Potassium 4.1  3.8  3.9    Chloride 101  97  100    Calcium 9.6  9.6  9.8    Albumin 4.1  4.1  4.1    Total Bilirubin 0.4   0.5    Alkaline Phosphatase 149   155    AST  (SGOT) 19   17    ALT (SGPT) 15   15    WBC  4.64  4.63    Hemoglobin  12.2  12.4    Hematocrit  36.3  38.6    Platelets  265  278    Total Cholesterol   169    Triglycerides   124    HDL Cholesterol   51    LDL Cholesterol    96    Hemoglobin A1C   6.10    Uric Acid   8.4             Lab Results   Component Value Date    SARSANTIGEN Not Detected 05/20/2022    COVID19 NOT DETECTED 12/02/2020    RAPFLUA Negative 05/20/2022    RAPFLUB Negative 05/20/2022    RAPSCRN Negative 05/20/2022    INR 1.03 (L) 11/17/2020    BILIRUBINUR Negative 11/14/2023       Procedures        Assessment and Plan    Diagnoses and all orders for this visit:    1. Primary hypertension (Primary)    2. Pure hypercholesterolemia    3. Gout, unspecified cause, unspecified chronicity, unspecified site    4. Axillary lump, left    5. Medication monitoring encounter    6. Constipation, unspecified constipation type    7. Prediabetes    8. Fatty liver    Other orders  -     hydroCHLOROthiazide 25 MG tablet; Take 1 tablet by mouth Daily.  Dispense: 90 tablet; Refill: 3    I discussed with patient the plan as documented above.  Should she have further issues with the lump sensation in her left axilla we did discuss getting a chest CT for further evaluation.  I did discuss with her continuing current management for hypertension at this time.  Should she have further issues with a cough she is instructed to call or return.  I did discuss with her continuing current management for gout as well as prediabetes.  We did discuss continue to monitor her elevated alkaline phosphatase level.  She has previously noted fatty liver.      Medications Discontinued During This Encounter   Medication Reason   • hydroCHLOROthiazide (HYDRODIURIL) 25 MG tablet Reorder          Follow Up   Return in about 3 months (around 8/29/2024) for Hypertension.  Patient was given instructions and counseling regarding her condition or for health maintenance advice. Please see specific  information pulled into the AVS if appropriate.       Abrahan Rick DO  05/29/24  12:47 EDT

## 2024-07-10 RX ORDER — HYDROCHLOROTHIAZIDE 25 MG/1
25 TABLET ORAL EVERY 24 HOURS
Qty: 90 TABLET | Refills: 3 | Status: SHIPPED | OUTPATIENT
Start: 2024-07-10

## 2024-07-10 NOTE — TELEPHONE ENCOUNTER
Caller: Wilner Jacobson    Relationship: Self    Best call back number: [phone]     Requested Prescriptions:   Requested Prescriptions     Pending Prescriptions Disp Refills    hydroCHLOROthiazide 25 MG tablet 90 tablet 3     Sig: Take 1 tablet by mouth Daily.        Pharmacy where request should be sent: [facility]/PHARMACY #[id number] - [place], [place] - [address] [facility] - [phone]  - [phone] FX     Last office visit with prescribing clinician: 5/29/2024   Last telemedicine visit with prescribing clinician: Visit date not found   Next office visit with prescribing clinician: 9/16/2024     Additional details provided by patient: PATIENT STATES SHE NEVER RECEIVED THE PRESCRIPTION FROM [facility] AND SHE IS OUT TOMORROW,  BUT SHE ALSO LEAVES TOWN TOMORROW. PATIENT STATES TO CALL HER WHEN THE PRESCRIPTION IS SENT IN.     Does the patient have less than a 3 day supply:  [x] Yes  [] No      Janet Barajas Rep   07/10/24 11:19 EDT

## 2024-09-26 ENCOUNTER — OFFICE VISIT (OUTPATIENT)
Dept: FAMILY MEDICINE CLINIC | Facility: CLINIC | Age: 69
End: 2024-09-26
Payer: MEDICARE

## 2024-09-26 VITALS
TEMPERATURE: 98.1 F | HEIGHT: 64 IN | HEART RATE: 89 BPM | SYSTOLIC BLOOD PRESSURE: 122 MMHG | OXYGEN SATURATION: 99 % | DIASTOLIC BLOOD PRESSURE: 72 MMHG | BODY MASS INDEX: 42.17 KG/M2 | WEIGHT: 247 LBS

## 2024-09-26 DIAGNOSIS — B37.31 YEAST VAGINITIS: ICD-10-CM

## 2024-09-26 DIAGNOSIS — N89.8 VAGINAL ITCHING: Primary | ICD-10-CM

## 2024-09-26 LAB
CANDIDA SPECIES: NEGATIVE
GARDNERELLA VAGINALIS: NEGATIVE
T VAGINALIS DNA VAG QL PROBE+SIG AMP: NEGATIVE

## 2024-09-26 PROCEDURE — 87480 CANDIDA DNA DIR PROBE: CPT | Performed by: NURSE PRACTITIONER

## 2024-09-26 PROCEDURE — 1159F MED LIST DOCD IN RCRD: CPT | Performed by: NURSE PRACTITIONER

## 2024-09-26 PROCEDURE — 87510 GARDNER VAG DNA DIR PROBE: CPT | Performed by: NURSE PRACTITIONER

## 2024-09-26 PROCEDURE — 3044F HG A1C LEVEL LT 7.0%: CPT | Performed by: NURSE PRACTITIONER

## 2024-09-26 PROCEDURE — 1160F RVW MEDS BY RX/DR IN RCRD: CPT | Performed by: NURSE PRACTITIONER

## 2024-09-26 PROCEDURE — 87660 TRICHOMONAS VAGIN DIR PROBE: CPT | Performed by: NURSE PRACTITIONER

## 2024-09-26 PROCEDURE — 99213 OFFICE O/P EST LOW 20 MIN: CPT | Performed by: NURSE PRACTITIONER

## 2024-09-26 PROCEDURE — 1126F AMNT PAIN NOTED NONE PRSNT: CPT | Performed by: NURSE PRACTITIONER

## 2024-09-26 RX ORDER — FLUCONAZOLE 150 MG/1
TABLET ORAL
Qty: 2 TABLET | Refills: 0 | Status: SHIPPED | OUTPATIENT
Start: 2024-09-26

## 2024-12-19 RX ORDER — ATORVASTATIN CALCIUM 40 MG/1
40 TABLET, FILM COATED ORAL DAILY
Qty: 90 TABLET | Refills: 3 | Status: SHIPPED | OUTPATIENT
Start: 2024-12-19

## 2024-12-23 RX ORDER — HYDROCHLOROTHIAZIDE 25 MG/1
25 TABLET ORAL EVERY 24 HOURS
Qty: 90 TABLET | Refills: 3 | Status: SHIPPED | OUTPATIENT
Start: 2024-12-23

## 2024-12-23 NOTE — TELEPHONE ENCOUNTER
Caller: Wilner Jacobson    Relationship: Self    Best call back number: [phone]     Requested Prescriptions:   Requested Prescriptions     Pending Prescriptions Disp Refills    hydroCHLOROthiazide 25 MG tablet 90 tablet 3     Sig: Take 1 tablet by mouth Daily.        Pharmacy where request should be sent: [facility]/PHARMACY #[id number] - [place], [place] - [address] [facility] [place] [facility] - [phone]  - [phone] FX     Last office visit with prescribing clinician: 5/29/2024   Last telemedicine visit with prescribing clinician: Visit date not found   Next office visit with prescribing clinician: Visit date not found     Additional details provided by patient: PATIENT IS ASKING FOR A 2 WEEK SUPPLY - SHE IS OUT OF STATE AND HAS LEFT HER MEDICINE    PLEASE ADVISE     PATIENT WOULD LIKE A CALL BACK          Janet Cheney Rep   12/23/24 14:21 EST

## (undated) DEVICE — SNAR POLYP CAPTIFLEX XS/OVL 11X2.4MM 240CM 1P/U

## (undated) DEVICE — SOL IRR H2O BTL 1000ML STRL

## (undated) DEVICE — COLON KIT: Brand: MEDLINE INDUSTRIES, INC.

## (undated) DEVICE — TUBING, SUCTION, 1/4" X 10', STRAIGHT: Brand: MEDLINE

## (undated) DEVICE — STERILE POLYISOPRENE POWDER-FREE SURGICAL GLOVES: Brand: PROTEXIS

## (undated) DEVICE — GLV SURG SENSICARE PI LF PF 7.5 GRN STRL

## (undated) DEVICE — THE SINGLE USE ETRAP – POLYP TRAP IS USED FOR SUCTION RETRIEVAL OF ENDOSCOPICALLY REMOVED POLYPS.: Brand: ETRAP

## (undated) DEVICE — SOL IRRG H2O PL/BG 1000ML STRL

## (undated) DEVICE — Device: Brand: DEFENDO AIR/WATER/SUCTION AND BIOPSY VALVE